# Patient Record
Sex: FEMALE | Race: WHITE | NOT HISPANIC OR LATINO
[De-identification: names, ages, dates, MRNs, and addresses within clinical notes are randomized per-mention and may not be internally consistent; named-entity substitution may affect disease eponyms.]

---

## 2017-01-12 ENCOUNTER — APPOINTMENT (OUTPATIENT)
Dept: CARDIOLOGY | Facility: CLINIC | Age: 67
End: 2017-01-12

## 2017-01-12 ENCOUNTER — OUTPATIENT (OUTPATIENT)
Dept: OUTPATIENT SERVICES | Facility: HOSPITAL | Age: 67
LOS: 1 days | Discharge: HOME | End: 2017-01-12

## 2017-01-24 ENCOUNTER — APPOINTMENT (OUTPATIENT)
Dept: CARDIOLOGY | Facility: CLINIC | Age: 67
End: 2017-01-24

## 2017-01-24 VITALS
HEIGHT: 63 IN | BODY MASS INDEX: 25.69 KG/M2 | HEART RATE: 59 BPM | DIASTOLIC BLOOD PRESSURE: 70 MMHG | OXYGEN SATURATION: 98 % | SYSTOLIC BLOOD PRESSURE: 104 MMHG | WEIGHT: 145 LBS

## 2017-01-24 DIAGNOSIS — Z82.49 FAMILY HISTORY OF ISCHEMIC HEART DISEASE AND OTHER DISEASES OF THE CIRCULATORY SYSTEM: ICD-10-CM

## 2017-01-24 DIAGNOSIS — Z78.9 OTHER SPECIFIED HEALTH STATUS: ICD-10-CM

## 2017-03-24 ENCOUNTER — APPOINTMENT (OUTPATIENT)
Dept: CARDIOLOGY | Facility: CLINIC | Age: 67
End: 2017-03-24

## 2017-03-24 VITALS — BODY MASS INDEX: 26.57 KG/M2 | SYSTOLIC BLOOD PRESSURE: 110 MMHG | DIASTOLIC BLOOD PRESSURE: 60 MMHG | WEIGHT: 150 LBS

## 2017-03-24 RX ORDER — APIXABAN 5 MG/1
5 TABLET, FILM COATED ORAL
Qty: 60 | Refills: 0 | Status: DISCONTINUED | COMMUNITY
Start: 2017-01-24 | End: 2017-03-24

## 2017-03-24 RX ORDER — FLECAINIDE ACETATE 50 MG/1
50 TABLET ORAL TWICE DAILY
Qty: 60 | Refills: 0 | Status: DISCONTINUED | COMMUNITY
Start: 2017-01-24 | End: 2017-03-24

## 2017-06-23 ENCOUNTER — APPOINTMENT (OUTPATIENT)
Dept: CARDIOLOGY | Facility: CLINIC | Age: 67
End: 2017-06-23

## 2017-06-27 DIAGNOSIS — I48.91 UNSPECIFIED ATRIAL FIBRILLATION: ICD-10-CM

## 2019-01-05 ENCOUNTER — INPATIENT (INPATIENT)
Facility: HOSPITAL | Age: 69
LOS: 1 days | Discharge: HOME | End: 2019-01-07
Attending: INTERNAL MEDICINE | Admitting: INTERNAL MEDICINE

## 2019-01-05 VITALS
DIASTOLIC BLOOD PRESSURE: 72 MMHG | WEIGHT: 149.91 LBS | HEIGHT: 66 IN | SYSTOLIC BLOOD PRESSURE: 117 MMHG | HEART RATE: 144 BPM | RESPIRATION RATE: 18 BRPM | TEMPERATURE: 98 F | OXYGEN SATURATION: 100 %

## 2019-01-05 LAB
ALBUMIN SERPL ELPH-MCNC: 3.9 G/DL — SIGNIFICANT CHANGE UP (ref 3.5–5.2)
ALP SERPL-CCNC: 131 U/L — HIGH (ref 30–115)
ALT FLD-CCNC: 205 U/L — HIGH (ref 0–41)
ANION GAP SERPL CALC-SCNC: 14 MMOL/L — SIGNIFICANT CHANGE UP (ref 7–14)
ANION GAP SERPL CALC-SCNC: 15 MMOL/L — HIGH (ref 7–14)
APTT BLD: 30 SEC — SIGNIFICANT CHANGE UP (ref 27–39.2)
AST SERPL-CCNC: 163 U/L — HIGH (ref 0–41)
BASE EXCESS BLDV CALC-SCNC: 1.9 MMOL/L — SIGNIFICANT CHANGE UP (ref -2–2)
BASOPHILS # BLD AUTO: 0.05 K/UL — SIGNIFICANT CHANGE UP (ref 0–0.2)
BASOPHILS NFR BLD AUTO: 0.5 % — SIGNIFICANT CHANGE UP (ref 0–1)
BILIRUB SERPL-MCNC: 0.2 MG/DL — SIGNIFICANT CHANGE UP (ref 0.2–1.2)
BUN SERPL-MCNC: 18 MG/DL — SIGNIFICANT CHANGE UP (ref 10–20)
BUN SERPL-MCNC: 22 MG/DL — HIGH (ref 10–20)
CA-I SERPL-SCNC: 1.2 MMOL/L — SIGNIFICANT CHANGE UP (ref 1.12–1.3)
CALCIUM SERPL-MCNC: 9.3 MG/DL — SIGNIFICANT CHANGE UP (ref 8.5–10.1)
CALCIUM SERPL-MCNC: 9.3 MG/DL — SIGNIFICANT CHANGE UP (ref 8.5–10.1)
CHLORIDE SERPL-SCNC: 102 MMOL/L — SIGNIFICANT CHANGE UP (ref 98–110)
CHLORIDE SERPL-SCNC: 99 MMOL/L — SIGNIFICANT CHANGE UP (ref 98–110)
CO2 SERPL-SCNC: 25 MMOL/L — SIGNIFICANT CHANGE UP (ref 17–32)
CO2 SERPL-SCNC: 27 MMOL/L — SIGNIFICANT CHANGE UP (ref 17–32)
CREAT SERPL-MCNC: 0.9 MG/DL — SIGNIFICANT CHANGE UP (ref 0.7–1.5)
CREAT SERPL-MCNC: 0.9 MG/DL — SIGNIFICANT CHANGE UP (ref 0.7–1.5)
D DIMER BLD IA.RAPID-MCNC: 243 NG/ML DDU — HIGH (ref 0–230)
EOSINOPHIL # BLD AUTO: 0.1 K/UL — SIGNIFICANT CHANGE UP (ref 0–0.7)
EOSINOPHIL NFR BLD AUTO: 1.1 % — SIGNIFICANT CHANGE UP (ref 0–8)
GAS PNL BLDV: 137 MMOL/L — SIGNIFICANT CHANGE UP (ref 136–145)
GAS PNL BLDV: SIGNIFICANT CHANGE UP
GLUCOSE SERPL-MCNC: 124 MG/DL — HIGH (ref 70–99)
GLUCOSE SERPL-MCNC: 150 MG/DL — HIGH (ref 70–99)
HCO3 BLDV-SCNC: 27 MMOL/L — SIGNIFICANT CHANGE UP (ref 22–29)
HCT VFR BLD CALC: 37.6 % — SIGNIFICANT CHANGE UP (ref 37–47)
HCT VFR BLD CALC: 38.1 % — SIGNIFICANT CHANGE UP (ref 37–47)
HCT VFR BLDA CALC: 41.1 % — SIGNIFICANT CHANGE UP (ref 34–44)
HGB BLD CALC-MCNC: 13.4 G/DL — LOW (ref 14–18)
HGB BLD-MCNC: 12.2 G/DL — SIGNIFICANT CHANGE UP (ref 12–16)
HGB BLD-MCNC: 12.7 G/DL — SIGNIFICANT CHANGE UP (ref 12–16)
IMM GRANULOCYTES NFR BLD AUTO: 0.3 % — SIGNIFICANT CHANGE UP (ref 0.1–0.3)
INR BLD: 1.02 RATIO — SIGNIFICANT CHANGE UP (ref 0.65–1.3)
IRON SATN MFR SERPL: 15 % — SIGNIFICANT CHANGE UP (ref 15–50)
IRON SATN MFR SERPL: 45 UG/DL — SIGNIFICANT CHANGE UP (ref 35–150)
LACTATE BLDV-MCNC: 1.1 MMOL/L — SIGNIFICANT CHANGE UP (ref 0.5–1.6)
LYMPHOCYTES # BLD AUTO: 3.97 K/UL — HIGH (ref 1.2–3.4)
LYMPHOCYTES # BLD AUTO: 42.7 % — SIGNIFICANT CHANGE UP (ref 20.5–51.1)
MCHC RBC-ENTMCNC: 29.3 PG — SIGNIFICANT CHANGE UP (ref 27–31)
MCHC RBC-ENTMCNC: 29.9 PG — SIGNIFICANT CHANGE UP (ref 27–31)
MCHC RBC-ENTMCNC: 32.4 G/DL — SIGNIFICANT CHANGE UP (ref 32–37)
MCHC RBC-ENTMCNC: 33.3 G/DL — SIGNIFICANT CHANGE UP (ref 32–37)
MCV RBC AUTO: 89.6 FL — SIGNIFICANT CHANGE UP (ref 81–99)
MCV RBC AUTO: 90.4 FL — SIGNIFICANT CHANGE UP (ref 81–99)
MONOCYTES # BLD AUTO: 0.56 K/UL — SIGNIFICANT CHANGE UP (ref 0.1–0.6)
MONOCYTES NFR BLD AUTO: 6 % — SIGNIFICANT CHANGE UP (ref 1.7–9.3)
NEUTROPHILS # BLD AUTO: 4.59 K/UL — SIGNIFICANT CHANGE UP (ref 1.4–6.5)
NEUTROPHILS NFR BLD AUTO: 49.4 % — SIGNIFICANT CHANGE UP (ref 42.2–75.2)
NRBC # BLD: 0 /100 WBCS — SIGNIFICANT CHANGE UP (ref 0–0)
NRBC # BLD: 0 /100 WBCS — SIGNIFICANT CHANGE UP (ref 0–0)
NT-PROBNP SERPL-SCNC: 1108 PG/ML — HIGH (ref 0–300)
PCO2 BLDV: 44 MMHG — SIGNIFICANT CHANGE UP (ref 41–51)
PH BLDV: 7.4 — SIGNIFICANT CHANGE UP (ref 7.26–7.43)
PLATELET # BLD AUTO: 281 K/UL — SIGNIFICANT CHANGE UP (ref 130–400)
PLATELET # BLD AUTO: 281 K/UL — SIGNIFICANT CHANGE UP (ref 130–400)
PO2 BLDV: 36 MMHG — SIGNIFICANT CHANGE UP (ref 20–40)
POTASSIUM BLDV-SCNC: 4.1 MMOL/L — SIGNIFICANT CHANGE UP (ref 3.3–5.6)
POTASSIUM SERPL-MCNC: 4.1 MMOL/L — SIGNIFICANT CHANGE UP (ref 3.5–5)
POTASSIUM SERPL-MCNC: 4.4 MMOL/L — SIGNIFICANT CHANGE UP (ref 3.5–5)
POTASSIUM SERPL-SCNC: 4.1 MMOL/L — SIGNIFICANT CHANGE UP (ref 3.5–5)
POTASSIUM SERPL-SCNC: 4.4 MMOL/L — SIGNIFICANT CHANGE UP (ref 3.5–5)
PROT SERPL-MCNC: 6.9 G/DL — SIGNIFICANT CHANGE UP (ref 6–8)
PROTHROM AB SERPL-ACNC: 11.7 SEC — SIGNIFICANT CHANGE UP (ref 9.95–12.87)
RBC # BLD: 4.16 M/UL — LOW (ref 4.2–5.4)
RBC # BLD: 4.25 M/UL — SIGNIFICANT CHANGE UP (ref 4.2–5.4)
RBC # FLD: 12.5 % — SIGNIFICANT CHANGE UP (ref 11.5–14.5)
RBC # FLD: 12.7 % — SIGNIFICANT CHANGE UP (ref 11.5–14.5)
SAO2 % BLDV: 67 % — SIGNIFICANT CHANGE UP
SODIUM SERPL-SCNC: 139 MMOL/L — SIGNIFICANT CHANGE UP (ref 135–146)
SODIUM SERPL-SCNC: 143 MMOL/L — SIGNIFICANT CHANGE UP (ref 135–146)
TIBC SERPL-MCNC: 297 UG/DL — SIGNIFICANT CHANGE UP (ref 220–430)
TRANSFERRIN SERPL-MCNC: 247 MG/DL — SIGNIFICANT CHANGE UP (ref 200–360)
TROPONIN T SERPL-MCNC: <0.01 NG/ML — SIGNIFICANT CHANGE UP
UIBC SERPL-MCNC: 252 UG/DL — SIGNIFICANT CHANGE UP (ref 110–370)
WBC # BLD: 10 K/UL — SIGNIFICANT CHANGE UP (ref 4.8–10.8)
WBC # BLD: 9.3 K/UL — SIGNIFICANT CHANGE UP (ref 4.8–10.8)
WBC # FLD AUTO: 10 K/UL — SIGNIFICANT CHANGE UP (ref 4.8–10.8)
WBC # FLD AUTO: 9.3 K/UL — SIGNIFICANT CHANGE UP (ref 4.8–10.8)

## 2019-01-05 RX ORDER — ASPIRIN/CALCIUM CARB/MAGNESIUM 324 MG
81 TABLET ORAL DAILY
Qty: 0 | Refills: 0 | Status: DISCONTINUED | OUTPATIENT
Start: 2019-01-05 | End: 2019-01-07

## 2019-01-05 RX ORDER — METOPROLOL TARTRATE 50 MG
50 TABLET ORAL
Qty: 0 | Refills: 0 | Status: DISCONTINUED | OUTPATIENT
Start: 2019-01-05 | End: 2019-01-07

## 2019-01-05 RX ORDER — INFLUENZA VIRUS VACCINE 15; 15; 15; 15 UG/.5ML; UG/.5ML; UG/.5ML; UG/.5ML
0.5 SUSPENSION INTRAMUSCULAR ONCE
Qty: 0 | Refills: 0 | Status: COMPLETED | OUTPATIENT
Start: 2019-01-05 | End: 2019-01-05

## 2019-01-05 RX ORDER — APIXABAN 2.5 MG/1
5 TABLET, FILM COATED ORAL EVERY 12 HOURS
Qty: 0 | Refills: 0 | Status: DISCONTINUED | OUTPATIENT
Start: 2019-01-05 | End: 2019-01-07

## 2019-01-05 RX ADMIN — Medication 50 MILLIGRAM(S): at 17:41

## 2019-01-05 RX ADMIN — APIXABAN 5 MILLIGRAM(S): 2.5 TABLET, FILM COATED ORAL at 05:59

## 2019-01-05 RX ADMIN — Medication 81 MILLIGRAM(S): at 12:31

## 2019-01-05 RX ADMIN — Medication 50 MILLIGRAM(S): at 05:59

## 2019-01-05 RX ADMIN — APIXABAN 5 MILLIGRAM(S): 2.5 TABLET, FILM COATED ORAL at 17:41

## 2019-01-05 NOTE — ED ADULT NURSE NOTE - CCCP TRG CHIEF CMPLNT
SOB/palpitations [No Pertinent Cardiac History] : no history of aortic stenosis, atrial fibrillation, coronary artery disease, recent myocardial infarction, or implantable device/pacemaker [No Pertinent Pulmonary History] : no history of asthma, COPD, sleep apnea, or smoking [No Adverse Anesthesia Reaction] : no adverse anesthesia reaction in self or family member [(Patient denies any chest pain, claudication, dyspnea on exertion, orthopnea, palpitations or syncope)] : Patient denies any chest pain, claudication, dyspnea on exertion, orthopnea, palpitations or syncope [Chronic Anticoagulation] : no chronic anticoagulation [Chronic Kidney Disease] : no chronic kidney disease [Diabetes] : no diabetes [FreeTextEntry1] : cataract [FreeTextEntry2] : Monday January 7, 2019    to be done at [facility] [FreeTextEntry3] : Kelly Perry MD

## 2019-01-05 NOTE — ED PROVIDER NOTE - MEDICAL DECISION MAKING DETAILS
Pt rate controlled w cardizem. HR 70s. Patient admitted to telemetry for further management and carer

## 2019-01-05 NOTE — H&P ADULT - ATTENDING COMMENTS
69 y/o FM w/ pMHx of Paroxysmal afib/aflutter p/w 3 day hx of palpitations and was a/w intermittent exertional sob. Since the palpitations were not resolving on their own pt presented to the ED. Pt was previously diagnosed w/ P-Afib/flutter by Dr. Hope and was started on metoprolol and flecanide for rhythm control. Pt's CHADSCVASC is 2 and was advised to be on eliquis. Whilst on eliquis an "aide" told her that she can easily bleed while being on the blood thinner and advised her to stop it hence she agreed. Pt has not bee on blood thinners for the past year.  Currently denies palpitaations, sob, dizziness, confusion, chest pain or abdominal pain or any focal deficitis    #Aflutter s/p IV Cardizem - Rate controlled   - CHADSCVASC 2  - HASBLED 0  - Now rate controlled with just PO metoprolol to 50 BID. Average HR on monitor 108/min. 1 full minute radial pulse counted by me to be 105/min. So at goal.   Cardio eval to see if she can be considered for cardioverrsion. Then potential D/c     - will start pt again on eliquis for A/C    - f/u echo -WNL     #Transaminitis (New) - Probably 2/2 the flecainide vs LISA  - AST/ALT: 163/205, . T bili wnl  -Off flecainide currently. Monitor CMP  - f/u RUQ sono  - f/u hepatitis panel, iron studies, LARISSA, ASMA, TSH,       #Activitiy: OOBC  #Diet: Regular  #DVT/GI PPX: Eliquis and Protonix  #dispo: form home lives with daughter - no needs

## 2019-01-05 NOTE — PATIENT PROFILE ADULT - NSTRANSFERBELONGINGSRESP_GEN_A_NUR
Subjective   Chief Complaint   Patient presents with   • [facility]     Camila March is a 41 y.o. year old  presenting to be seen for her annual exam.     SEXUAL Hx:  She is currently sexually active.  In the past year there there has been NO new sexual partners.    Condoms are never used.  She would not like to be screened for STD's at today's exam.  Current birth control method: not using any form of contraception.  She is not trying to conceive but would be OK if she did get pregnant.  She is happy with her current method of contraception and does not want to discuss alternative methods of contraception.  MENSTRUAL Hx:  Patient's last menstrual period was 2018 (approximate).  In the past 6 months her cycles have been regular, predictable and occur monthly.  Her menstrual flow is typically normal.   Each month on average there are roughly 0 day(s) of very heavy flow.    Intermenstrual bleeding is absent.    Post-coital bleeding is absent.  Dysmenorrhea: is not affecting her activities of daily living  PMS: is not affecting her activities of daily living  Her cycles are not a source of concern for her that she wishes to discuss today.  HEALTH Hx:  She exercises regularly: no (and has no plans to become more active).  She wears her seat belt: yes.  She has concerns about domestic violence: no.  OTHER THINGS SHE WANTS TO DISCUSS TODAY:  Nothing else    The following portions of the patient's history were reviewed and updated as appropriate:problem list, current medications, allergies, past family history, past medical history, past social history and past surgical history.    Smoking status: Never Smoker                                                              Smokeless tobacco: Never Used                        Review of Systems  Constitutional POS: nothing reported    NEG: anorexia or night sweats   Genitourinary POS: nothing reported    NEG: dysuria or hematuria      Gastointestinal  POS: nothing reported    NEG: bloating, change in bowel habits, melena or reflux symptoms   Integument POS: nothing reported    NEG: moles that are changing in size, shape, color or rashes   Breast POS: nothing reported    NEG: persistent breast lump, skin dimpling or nipple discharge        Objective   /74   Resp 14   Wt 83.9 kg (185 lb)   LMP 04/18/2018 (Approximate)   Breastfeeding? No   BMI 29.86 kg/m²     General:  well developed; well nourished  no acute distress   Skin:  No suspicious lesions seen   Thyroid: normal to inspection and palpation   Breasts:  Examined in supine position  Symmetric without masses or skin dimpling  Nipples normal without inversion, lesions or discharge  There are no palpable axillary nodes   Abdomen: soft, non-tender; no masses  no umbilical or inginual hernias are present  no hepato-splenomegaly   Pelvis: Clinical staff was present for exam  External genitalia:  normal appearance of the external genitalia including Bartholin's and [place]'s glands.  :  urethral meatus normal;  Vaginal:  normal pink mucosa without prolapse or lesions.  Cervix:  normal appearance.  Uterus:  normal size, shape and consistency.  Adnexa:  normal bimanual exam of the adnexa.  Rectal:  digital rectal exam not performed; anus visually normal appearing.        Assessment   1. Normal GYN exam     Plan   1. Pap was done today.  If she does not receive the results of the Pap within 2 weeks  time, she was instructed to call to find out the results.  I explained to Camila that the recommendations for Pap smear interval in a low risk patient's has lengthened to 3 years time.  I encouraged her to be seen yearly for a full physical exam including breast and pelvic exam even during the off years when PAP's will not be performed.  2. Folic acid for the prevention of neural tube defects was discussed.  At least 0.4 mg should be taken preconceptionally to reduce the risk.  It was explained that this may  reduce the baseline incidence of neural tube defect by as much as 65%.  Folic acid can be found in OTC multivitamins, OTC prenatal vitamins or breakfast cereal that that contains 100% of the RDA of folate.  3. The importance of keeping all planned follow-up and taking all medications as prescribed was emphasized.  4. Follow up for annual exam 1 year         This note was electronically signed.    Duy Rodríguez M.D.  May 2, 2018    Note: Speech recognition transcription software may have been used to create portions of this document.  An attempt at proofreading has been made but errors in transcription could still be present.   yes

## 2019-01-05 NOTE — H&P ADULT - ASSESSMENT
69 y/o FM w/ pMHx of Paroxysmal afib/aflutter p/w 3 day hx of palpitations and was a/w intermittent exertional sob. Since the palpitations were not resolving on their own pt presented to the ED. Pt was previously diagnosed w/ P-Afib/flutter by Dr. Hope and was started on metoprolol and flecanide for rhythm control. Pt's CHADSCVASC is 2 and was advised to be on eliquis. Whilst on eliquis an "aide" told her that she can easily bleed while being on the blood thinner and advised her to stop it hence she agreed. Pt has not bee on blood thinners for the past year.  Currently denies palpitaations, sob, dizziness, confusion, chest pain or abdominal pain or any focal deficitis    #Aflutter s/p IV Cardizem - Rate controlled   - CHADSCVASC 2  - HASBLED 0  - will increase dose of metoprolol to 50 BID  - will start pt on eliquis for A/C  - will get cardiology eval    #Transaminitis (New) - Probably 2/2 LISA  - AST/ALT: 163/205, . T bili wnl  - f/u RUQ sono  - f/u hepatitis panel, iron studies, LARISSA, ASMA, TSH,       #Activitiy: OOBC  #Diet: Regular  #DVT/GI PPX: Eliquis and Protonix  #dispo: form home lives with daughter - no needs 69 y/o FM w/ pMHx of Paroxysmal afib/aflutter p/w 3 day hx of palpitations and was a/w intermittent exertional sob. Since the palpitations were not resolving on their own pt presented to the ED. Pt was previously diagnosed w/ P-Afib/flutter by Dr. Hope and was started on metoprolol and flecanide for rhythm control. Pt's CHADSCVASC is 2 and was advised to be on eliquis. Whilst on eliquis an "aide" told her that she can easily bleed while being on the blood thinner and advised her to stop it hence she agreed. Pt has not bee on blood thinners for the past year.  Currently denies palpitaations, sob, dizziness, confusion, chest pain or abdominal pain or any focal deficitis    #Aflutter s/p IV Cardizem - Rate controlled   - CHADSCVASC 2  - HASBLED 0  - will increase dose of metoprolol to 50 BID  - will start pt on eliquis for A/C  - will get cardiology eval  - f/u echo    #Transaminitis (New) - Probably 2/2 LISA  - AST/ALT: 163/205, . T bili wnl  - f/u RUQ sono  - f/u hepatitis panel, iron studies, LARISSA, ASMA, TSH,       #Activitiy: OOBC  #Diet: Regular  #DVT/GI PPX: Eliquis and Protonix  #dispo: form home lives with daughter - no needs

## 2019-01-05 NOTE — ED PROVIDER NOTE - ATTENDING CONTRIBUTION TO CARE
I personally evaluated the patient. I reviewed the Resident’s or Physician Assistant’s note (as assigned above), and agree with the findings and plan except as documented in my note.    69 yo F with hx of atrial flutter presents for evaluation of palpitations for 3 days. No CP, SOB. No nausea, vomiting. No back pain. no fevers, chills.     CONSTITUTIONAL: Well-developed; well-nourished; in no acute distress. Sitting up and providing appropriate history and physical examination  SKIN: skin exam is warm and dry, no acute rash.  HEAD: Normocephalic; atraumatic.  EYES: PERRL, 3 mm bilateral, no nystagmus, EOM intact; conjunctiva and sclera clear.  ENT: No nasal discharge; airway clear.  NECK: Supple; non tender.+ full passive ROM in all directions. No JVD  CARD: S1, S2 normal; no murmurs, gallops, or rubs. Regular rate and rhythm. + Symmetric Strong Pulses  RESP: No wheezes, rales or rhonchi. Good air movement bilaterally  ABD: soft; non-distended; non-tender. No Rebound, No Gaurding, No signs of peritnitis, No CVA tenderness  EXT: Normal ROM. No clubbing, cyanosis or edema. Dp and Pt Pulses intact. Cap refill less than 3 seconds  NEURO: CN 2-12 intact, normal finger to nose, normal romberg, stable gait, no sensory or motor deficits, Alert, oriented, grossly unremarkable. No Focal deficits. GCS 15. NIH 0  PSYCH: Cooperative, appropriate.    A/P: ECG shows a flutter w RVR (prob 3 day onset). Will draw labs, CXR. Will admit

## 2019-01-05 NOTE — H&P ADULT - HISTORY OF PRESENT ILLNESS
69 y/o FM w/ pMHx of Paroxysmal afib/aflutter p/w 3 day hx of palpitations and was a/w intermittent exertional sob. Since the palpitations were not resolving on their own pt presented to the ED. Pt was previously diagnosed w/ P-Afib/flutter by Dr. Hope and was started on metoprolol and flecanide for rhythm control. Pt's CHADSCVASC is 2 and was advised to be on eliquis. Whilst on eliquis an "aide" told her that she can easily bleed while being on the blood thinner and advised her to stop it hence she agreed. Pt has not bee on blood thinners for the past year.  Currently denies palpitaations, sob, dizziness, confusion, chest pain or abdominal pain or any focal deficitis    In the ED,  Vital Signs Last 24 Hrs  T(C): 36.7 (05 Jan 2019 00:51), Max: 36.7 (05 Jan 2019 00:51)  T(F): 98.1 (05 Jan 2019 00:51), Max: 98.1 (05 Jan 2019 00:51)  HR: 84 (05 Jan 2019 01:45) (84 - 144)  BP: 126/66 (05 Jan 2019 01:45) (117/72 - 126/66)  BP(mean): --  RR: 18 (05 Jan 2019 01:45) (18 - 18)  SpO2: 99% (05 Jan 2019 01:45) (99% - 100%)  Given: Cardizem IV push x1

## 2019-01-05 NOTE — ED ADULT NURSE NOTE - OBJECTIVE STATEMENT
Patient reports having fast heart rate since Sunday, was hoping the rate would improve. Patient reports slight SOB, slight dizziness, denies acute sharp chest pain, denies nausea. Patient reports having episode of fast heart rate two years prior, resolved after seeking emergency treatment.

## 2019-01-06 LAB
ALBUMIN SERPL ELPH-MCNC: 4 G/DL — SIGNIFICANT CHANGE UP (ref 3.5–5.2)
ALP SERPL-CCNC: 109 U/L — SIGNIFICANT CHANGE UP (ref 30–115)
ALT FLD-CCNC: 128 U/L — HIGH (ref 0–41)
ANION GAP SERPL CALC-SCNC: 14 MMOL/L — SIGNIFICANT CHANGE UP (ref 7–14)
AST SERPL-CCNC: 43 U/L — HIGH (ref 0–41)
BILIRUB SERPL-MCNC: 0.2 MG/DL — SIGNIFICANT CHANGE UP (ref 0.2–1.2)
BUN SERPL-MCNC: 19 MG/DL — SIGNIFICANT CHANGE UP (ref 10–20)
CALCIUM SERPL-MCNC: 9.2 MG/DL — SIGNIFICANT CHANGE UP (ref 8.5–10.1)
CHLORIDE SERPL-SCNC: 102 MMOL/L — SIGNIFICANT CHANGE UP (ref 98–110)
CO2 SERPL-SCNC: 27 MMOL/L — SIGNIFICANT CHANGE UP (ref 17–32)
CREAT SERPL-MCNC: 0.8 MG/DL — SIGNIFICANT CHANGE UP (ref 0.7–1.5)
FERRITIN SERPL-MCNC: 79 NG/ML — SIGNIFICANT CHANGE UP (ref 15–150)
GLUCOSE SERPL-MCNC: 124 MG/DL — HIGH (ref 70–99)
HAV IGM SER-ACNC: SIGNIFICANT CHANGE UP
HBV CORE IGM SER-ACNC: SIGNIFICANT CHANGE UP
HBV SURFACE AG SER-ACNC: SIGNIFICANT CHANGE UP
HCV AB S/CO SERPL IA: 0.19 S/CO — SIGNIFICANT CHANGE UP
HCV AB SERPL-IMP: SIGNIFICANT CHANGE UP
POTASSIUM SERPL-MCNC: 4.4 MMOL/L — SIGNIFICANT CHANGE UP (ref 3.5–5)
POTASSIUM SERPL-SCNC: 4.4 MMOL/L — SIGNIFICANT CHANGE UP (ref 3.5–5)
PROT SERPL-MCNC: 6.6 G/DL — SIGNIFICANT CHANGE UP (ref 6–8)
SODIUM SERPL-SCNC: 143 MMOL/L — SIGNIFICANT CHANGE UP (ref 135–146)
TSH SERPL-MCNC: 4.77 UIU/ML — HIGH (ref 0.27–4.2)
TSH SERPL-MCNC: 7.04 UIU/ML — HIGH (ref 0.27–4.2)

## 2019-01-06 RX ORDER — DILTIAZEM HCL 120 MG
120 CAPSULE, EXT RELEASE 24 HR ORAL DAILY
Qty: 0 | Refills: 0 | Status: DISCONTINUED | OUTPATIENT
Start: 2019-01-06 | End: 2019-01-07

## 2019-01-06 RX ADMIN — APIXABAN 5 MILLIGRAM(S): 2.5 TABLET, FILM COATED ORAL at 17:58

## 2019-01-06 RX ADMIN — Medication 120 MILLIGRAM(S): at 18:39

## 2019-01-06 RX ADMIN — APIXABAN 5 MILLIGRAM(S): 2.5 TABLET, FILM COATED ORAL at 06:20

## 2019-01-06 RX ADMIN — Medication 50 MILLIGRAM(S): at 06:20

## 2019-01-06 RX ADMIN — Medication 50 MILLIGRAM(S): at 17:58

## 2019-01-06 RX ADMIN — Medication 81 MILLIGRAM(S): at 11:40

## 2019-01-06 NOTE — PROGRESS NOTE ADULT - SUBJECTIVE AND OBJECTIVE BOX
S : No new events./complaints  No palpitations/cp/sob  walking ok.      All other pertinent ROS negative.      Vital Signs Last 24 Hrs  T(C): 35.8 (06 Jan 2019 14:03), Max: 36.8 (05 Jan 2019 21:02)  T(F): 96.4 (06 Jan 2019 14:03), Max: 98.2 (05 Jan 2019 21:02)  HR: 75 (06 Jan 2019 14:36) (70 - 127)  BP: 104/62 (06 Jan 2019 14:36) (103/64 - 126/60)  BP(mean): --  RR: 18 (06 Jan 2019 14:03) (16 - 18)  SpO2: --  PHYSICAL EXAM:    Constitutional: NAD, awake and alert, well-developed  HEENT: PERR, EOMI, Normal Hearing, MMM  Neck: Soft and supple, No LAD, No JVD  Respiratory: Breath sounds are clear bilaterally, No wheezing, rales or rhonchi  Cardiovascular: S1 and S2, regular rate and rhythm, no Murmurs, gallops or rubs  Gastrointestinal: Bowel Sounds present, soft, nontender, nondistended, no guarding, no rebound  Extremities: No peripheral edema        MEDICATIONS:  MEDICATIONS  (STANDING):  apixaban 5 milliGRAM(s) Oral every 12 hours  aspirin  chewable 81 milliGRAM(s) Oral daily  diltiazem Injectable 10 milliGRAM(s) IV Push once  influenza   Vaccine 0.5 milliLiter(s) IntraMuscular once  metoprolol tartrate 50 milliGRAM(s) Oral two times a day      LABS: All Labs Reviewed:                        12.2   9.30  )-----------( 281      ( 05 Jan 2019 18:15 )             37.6     01-05    143  |  102  |  18  ----------------------------<  150<H>  4.1   |  27  |  0.9    Ca    9.3      05 Jan 2019 18:15    TPro  6.9  /  Alb  3.9  /  TBili  0.2  /  DBili  x   /  AST  163<H>  /  ALT  205<H>  /  AlkPhos  131<H>  01-05    PT/INR - ( 05 Jan 2019 01:28 )   PT: 11.70 sec;   INR: 1.02 ratio         PTT - ( 05 Jan 2019 01:28 )  PTT:30.0 sec  CARDIAC MARKERS ( 05 Jan 2019 01:28 )  x     / <0.01 ng/mL / x     / x     / x          Blood Culture:     Radiology: reviewed

## 2019-01-06 NOTE — CONSULT NOTE ADULT - ASSESSMENT
patient seen and examined.  remains in A-flutter.  options discussed.  compliance discussed.  speciifc rational for anticoagulation (prevention of CVA) discussed.  Options of rate control vs. YOLANDA/Cardioversion vs. Ablation disucssed.    Patient would like to hold off on any procedure and try rate control first.  She is agreeable for anticoagulation.    Recommend:  1. Eliquis 5 mg Q12  2. C/w Metoprolol 50 mg q12  3. Add Cardizem  mg q24  May d/c home form cardiac perspective. Will consider RFA or cardioversion as outpatient if has recurrent tachycardia.  W/u of elevated LFT's as per primary team.  F/u with me in 2 weeks.

## 2019-01-06 NOTE — CONSULT NOTE ADULT - SUBJECTIVE AND OBJECTIVE BOX
Patient is a 68y old  Female who presents with a chief complaint of palpitations (05 Jan 2019 04:52)      HPI:  67 y/o FM w/ pMHx of Paroxysmal afib/aflutter p/w 3 day hx of palpitations and was a/w intermittent exertional sob. Since the palpitations were not resolving on their own pt presented to the ED. Pt was previously diagnosed w/ P-Afib/flutter by Dr. Hope and was started on metoprolol and flecanide for rhythm control. Pt's CHADSCVASC is 2 and was advised to be on eliquis. Whilst on eliquis an "aide" told her that she can easily bleed while being on the blood thinner and advised her to stop it hence she agreed. Pt has not bee on blood thinners for the past year.  Currently denies palpitaations, sob, dizziness, confusion, chest pain or abdominal pain or any focal deficitis    In the ED,  Vital Signs Last 24 Hrs  T(C): 36.7 (05 Jan 2019 00:51), Max: 36.7 (05 Jan 2019 00:51)  T(F): 98.1 (05 Jan 2019 00:51), Max: 98.1 (05 Jan 2019 00:51)  HR: 84 (05 Jan 2019 01:45) (84 - 144)  BP: 126/66 (05 Jan 2019 01:45) (117/72 - 126/66)  BP(mean): --  RR: 18 (05 Jan 2019 01:45) (18 - 18)  SpO2: 99% (05 Jan 2019 01:45) (99% - 100%)  Given: Cardizem IV push x1 (05 Jan 2019 04:52)      PAST MEDICAL & SURGICAL HISTORY:  Arrhythmia  No significant past surgical history      PREVIOUS DIAGNOSTIC TESTING:      ECHO  FINDINGS:  < from: Transthoracic Echocardiogram (01.05.19 @ 11:07) >  Summary:   1. LV Ejection Fraction by Ring's Method with a biplane EF of 54 %.   2. Normal left ventricular internal cavity size.   3. Normal left atrial size.   4. Normal right atrial size.   5. There is no evidence of pericardial effusion.   6. Mild mitral annular calcification.   7. Thickening of the anterior and posterior mitral valve leaflets.   8. Pulmonic valve regurgitation.    < end of copied text >      MEDICATIONS  (STANDING):  apixaban 5 milliGRAM(s) Oral every 12 hours  aspirin  chewable 81 milliGRAM(s) Oral daily  diltiazem Injectable 10 milliGRAM(s) IV Push once  influenza   Vaccine 0.5 milliLiter(s) IntraMuscular once  metoprolol tartrate 50 milliGRAM(s) Oral two times a day    MEDICATIONS  (PRN):      FAMILY HISTORY:  No pertinent family history in first degree relatives      SOCIAL HISTORY:  CIGARETTES:    ALCOHOL:    REVIEW OF SYSTEMS:  CONSTITUTIONAL: No fever, weight loss, or fatigue  EYES: No eye pain, visual disturbances, or discharge  ENMT:  No difficulty hearing, tinnitus, vertigo; No sinus or throat pain  NECK: No pain or stiffness  BREASTS: No pain, masses, or nipple discharge  RESPIRATORY: No cough, wheezing, chills or hemoptysis; No shortness of breath  CARDIOVASCULAR: See HPI.  GASTROINTESTINAL: No abdominal or epigastric pain. No nausea, vomiting, or hematemesis; No diarrhea or constipation. No melena or hematochezia.  GENITOURINARY: No dysuria, frequency, hematuria, or incontinence  NEUROLOGICAL: No headaches, memory loss, loss of strength, numbness, or tremors  SKIN: No itching, burning, rashes, or lesions   ENDOCRINE: No heat or cold intolerance;   MUSCULOSKELETAL: No joint pain or swelling; No muscle, back, or extremity pain  PSYCHIATRIC: No depression, anxiety, mood swings, or difficulty sleeping  HEME/LYMPH: No easy bruising, or bleeding gums  ALLERY AND IMMUNOLOGIC: No hives or eczema        Vital Signs Last 24 Hrs  T(C): 35.8 (06 Jan 2019 14:03), Max: 36.8 (05 Jan 2019 21:02)  T(F): 96.4 (06 Jan 2019 14:03), Max: 98.2 (05 Jan 2019 21:02)  HR: 75 (06 Jan 2019 14:36) (70 - 127)  BP: 104/62 (06 Jan 2019 14:36) (103/64 - 126/60)  BP(mean): --  RR: 18 (06 Jan 2019 14:03) (16 - 18)  SpO2: --        PHYSICAL EXAM:  GENERAL: NAD, AAO x 3  HEAD:  Atraumatic, Normocephalic  EYES: EOMI, PERRL, conjunctiva and sclera clear  ENMT: Moist mucous membranes  NECK: Supple, No JVD, No bruits  NERVOUS SYSTEM:  Alert & Oriented X3, Good concentration; No focal deficits  CHEST/LUNG: Clear to percussion bilaterally; No rales, rhonchi, wheezing, or rubs  HEART: Regular rate and rhythm; No murmurs, rubs, or gallops  ABDOMEN: Soft, Nontender, Nondistended; Bowel sounds present  EXTREMITIES:   No clubbing, cyanosis, or edema  SKIN: No rashes or lesions    INTERPRETATION OF TELEMETRY: A. flutter with 4:1 block    ECG:  < from: 12 Lead ECG (01.05.19 @ 07:52) >  Atrial flutter with variable A-V block  Inferior infarct , age undetermined  Cannot rule out Anterior infarct , age undetermined  Abnormal ECG    < end of copied text >      I&O's Detail      LABS:                        12.2   9.30  )-----------( 281      ( 05 Jan 2019 18:15 )             37.6     01-05    143  |  102  |  18  ----------------------------<  150<H>  4.1   |  27  |  0.9    Ca    9.3      05 Jan 2019 18:15    TPro  6.9  /  Alb  3.9  /  TBili  0.2  /  DBili  x   /  AST  163<H>  /  ALT  205<H>  /  AlkPhos  131<H>  01-05    CARDIAC MARKERS ( 05 Jan 2019 01:28 )  x     / <0.01 ng/mL / x     / x     / x          PT/INR - ( 05 Jan 2019 01:28 )   PT: 11.70 sec;   INR: 1.02 ratio         PTT - ( 05 Jan 2019 01:28 )  PTT:30.0 sec    I&O's Summary      RADIOLOGY & ADDITIONAL STUDIES:

## 2019-01-06 NOTE — PROGRESS NOTE ADULT - ASSESSMENT
69 y/o FM w/ pMHx of Paroxysmal afib/aflutter p/w 3 day hx of palpitations and was a/w intermittent exertional sob. Since the palpitations were not resolving on their own pt presented to the ED. Pt was previously diagnosed w/ P-Afib/flutter by Dr. Hope and was started on metoprolol and flecanide for rhythm control. Pt's CHADSCVASC is 2 and was advised to be on eliquis. Whilst on eliquis an "aide" told her that she can easily bleed while being on the blood thinner and advised her to stop it hence she agreed. Pt has not bee on blood thinners for the past year.  Currently denies palpitaations, sob, dizziness, confusion, chest pain or abdominal pain or any focal deficitis    #Aflutter s/p IV Cardizem   - CHADSCVASC 2  - HASBLED 0  - Now rate controlled with just PO metoprolol 50 BID. Average HR on monitor 70s - 108/min. 1 full minute radial pulse counted by me to be 105/min. So at goal.   Cardio suggests adding CArdizem  mg.   Cardio discussed options of cardioverrsion versus rate control. Patient wants to try just rate control for now.  Agrees for A/c.    c/w Eliquis    - f/u echo -WNL     #Transaminitis (New) - Probably 2/2 the flecainide  - AST/ALT: 163/205, . T bili wnl  -Off flecainide currently. Monitor CMP  No repeat CMP ordered this AM. We sent a CMP today afternoon. If it comes back to be less than yesterday - D/c patient home.   - RUQ sono - WNL  - hepatitis panel negative.  f/u iron studies, LRAISSA, ASMA, TSH,       #Activitiy: OOBC  #Diet: Regular  #DVT/GI PPX: Eliquis and Protonix  #dispo: form home lives with daughter - anticipate for discharge late tonight or tomorrow morning if LFTs show improvement.

## 2019-01-07 ENCOUNTER — TRANSCRIPTION ENCOUNTER (OUTPATIENT)
Age: 69
End: 2019-01-07

## 2019-01-07 VITALS — DIASTOLIC BLOOD PRESSURE: 62 MMHG | TEMPERATURE: 97 F | SYSTOLIC BLOOD PRESSURE: 112 MMHG | HEART RATE: 77 BPM

## 2019-01-07 LAB
ALBUMIN SERPL ELPH-MCNC: 3.7 G/DL — SIGNIFICANT CHANGE UP (ref 3.5–5.2)
ALP SERPL-CCNC: 94 U/L — SIGNIFICANT CHANGE UP (ref 30–115)
ALT FLD-CCNC: 102 U/L — HIGH (ref 0–41)
ANION GAP SERPL CALC-SCNC: 13 MMOL/L — SIGNIFICANT CHANGE UP (ref 7–14)
AST SERPL-CCNC: 32 U/L — SIGNIFICANT CHANGE UP (ref 0–41)
BILIRUB SERPL-MCNC: 0.4 MG/DL — SIGNIFICANT CHANGE UP (ref 0.2–1.2)
BUN SERPL-MCNC: 14 MG/DL — SIGNIFICANT CHANGE UP (ref 10–20)
CALCIUM SERPL-MCNC: 9.2 MG/DL — SIGNIFICANT CHANGE UP (ref 8.5–10.1)
CHLORIDE SERPL-SCNC: 99 MMOL/L — SIGNIFICANT CHANGE UP (ref 98–110)
CO2 SERPL-SCNC: 27 MMOL/L — SIGNIFICANT CHANGE UP (ref 17–32)
CREAT SERPL-MCNC: 0.7 MG/DL — SIGNIFICANT CHANGE UP (ref 0.7–1.5)
GLUCOSE SERPL-MCNC: 137 MG/DL — HIGH (ref 70–99)
POTASSIUM SERPL-MCNC: 3.8 MMOL/L — SIGNIFICANT CHANGE UP (ref 3.5–5)
POTASSIUM SERPL-SCNC: 3.8 MMOL/L — SIGNIFICANT CHANGE UP (ref 3.5–5)
PROT SERPL-MCNC: 6.4 G/DL — SIGNIFICANT CHANGE UP (ref 6–8)
SMOOTH MUSCLE AB SER-ACNC: SIGNIFICANT CHANGE UP
SODIUM SERPL-SCNC: 139 MMOL/L — SIGNIFICANT CHANGE UP (ref 135–146)

## 2019-01-07 RX ORDER — METOPROLOL TARTRATE 50 MG
1 TABLET ORAL
Qty: 60 | Refills: 0
Start: 2019-01-07 | End: 2019-02-05

## 2019-01-07 RX ORDER — APIXABAN 2.5 MG/1
1 TABLET, FILM COATED ORAL
Qty: 0 | Refills: 0 | COMMUNITY
Start: 2019-01-07

## 2019-01-07 RX ORDER — DILTIAZEM HCL 120 MG
1 CAPSULE, EXT RELEASE 24 HR ORAL
Qty: 0 | Refills: 0 | COMMUNITY
Start: 2019-01-07

## 2019-01-07 RX ORDER — APIXABAN 2.5 MG/1
1 TABLET, FILM COATED ORAL
Qty: 60 | Refills: 0 | OUTPATIENT
Start: 2019-01-07 | End: 2019-02-05

## 2019-01-07 RX ORDER — DILTIAZEM HCL 120 MG
1 CAPSULE, EXT RELEASE 24 HR ORAL
Qty: 30 | Refills: 0
Start: 2019-01-07 | End: 2019-02-05

## 2019-01-07 RX ORDER — DILTIAZEM HCL 120 MG
180 CAPSULE, EXT RELEASE 24 HR ORAL DAILY
Qty: 0 | Refills: 0 | Status: DISCONTINUED | OUTPATIENT
Start: 2019-01-08 | End: 2019-01-07

## 2019-01-07 RX ORDER — METOPROLOL TARTRATE 50 MG
1 TABLET ORAL
Qty: 0 | Refills: 0 | COMMUNITY

## 2019-01-07 RX ORDER — APIXABAN 2.5 MG/1
1 TABLET, FILM COATED ORAL
Qty: 60 | Refills: 0
Start: 2019-01-07 | End: 2019-02-05

## 2019-01-07 RX ORDER — METOPROLOL TARTRATE 50 MG
1 TABLET ORAL
Qty: 0 | Refills: 0 | COMMUNITY
Start: 2019-01-07

## 2019-01-07 RX ORDER — ASPIRIN/CALCIUM CARB/MAGNESIUM 324 MG
1 TABLET ORAL
Qty: 0 | Refills: 0 | COMMUNITY

## 2019-01-07 RX ADMIN — APIXABAN 5 MILLIGRAM(S): 2.5 TABLET, FILM COATED ORAL at 05:56

## 2019-01-07 RX ADMIN — Medication 120 MILLIGRAM(S): at 05:58

## 2019-01-07 RX ADMIN — Medication 81 MILLIGRAM(S): at 11:47

## 2019-01-07 NOTE — PROGRESS NOTE ADULT - ASSESSMENT
options discussed  again refused ablation or YOLANDA/cardioversion  Prefers medical therapy/rate control. As her HR is controlled now, this is acceptable.  She is agreeable for anticoagulation.    C/w metoprolol 50 mg q12. C/w Eliquis 5 mg q12  D/c ASA  C/w Cardizem  mg QD. If HR elevated, increase to 180 mg QD  Outpatient follow-up to make a decision on further management.  D/c planning for today.

## 2019-01-07 NOTE — PROGRESS NOTE ADULT - SUBJECTIVE AND OBJECTIVE BOX
ROSEANN MCDANIEL  68y Female    CHIEF COMPLAINT:    Patient is a 68y old  Female who presents with a chief complaint of palpitations (2019 09:52)      INTERVAL HPI/OVERNIGHT EVENTS:    Patient seen and examined.    ROS: All other systems are negative.    Vital Signs:    T(F): 98 (19 @ 05:47), Max: 98 (19 @ 05:47)  HR: 75 (19 @ 05:47) (75 - 133)  BP: 109/62 (19 @ 05:47) (104/62 - 126/60)  RR: 18 (19 @ 05:47) (16 - 18)  SpO2: 98% (19 @ 08:10) (98% - 98%)  I&O's Summary    Daily Height in cm: 167.64 (2019 09:52)    Daily Weight in k (2019 06:24)  CAPILLARY BLOOD GLUCOSE          PHYSICAL EXAM:    GENERAL:  NAD  SKIN: No rashes or lesions  HENT: Atrumatic. Normocephalic. PERRL. Moist membranes.  NECK: Supple, No JVD. No lymphadenopathy.  PULMONARY: CTA B/L. No wheezing. No rales  CVS: Normal S1, S2. Rate and Rythm are regular. No murmurs.  ABDOMEN/GI: Soft, Nontender, Nondistended; BS present  EXTREMITIES: Peripheral pulses intact. No edema B/L LE.  NEUROLOGIC:  No motor or sensory deficit.  PSYCH: Alert & oriented x 3    Consultant(s) Notes Reviewed:  [x ] YES  [ ] NO  Care Discussed with Consultants/Other Providers [ x] YES  [ ] NO    EKG reviewed  Telemetry reviewed    LABS:                        12.2   9.30  )-----------( 281      ( 2019 18:15 )             37.6     -    139  |  99  |  14  ----------------------------<  137<H>  3.8   |  27  |  0.7    Ca    9.2      2019 08:25    TPro  6.4  /  Alb  3.7  /  TBili  0.4  /  DBili  x   /  AST  32  /  ALT  102<H>  /  AlkPhos  94        Serum Pro-Brain Natriuretic Peptide: 1108 pg/mL (19 @ 01:28)    Trop <0.01, CKMB --, CK --, 19 @ 01:28        RADIOLOGY & ADDITIONAL TESTS:      Imaging or report Personally Reviewed:  [ ] YES  [ ] NO    Medications:  Standing  apixaban 5 milliGRAM(s) Oral every 12 hours  aspirin  chewable 81 milliGRAM(s) Oral daily  diltiazem    milliGRAM(s) Oral daily  influenza   Vaccine 0.5 milliLiter(s) IntraMuscular once  metoprolol tartrate 50 milliGRAM(s) Oral two times a day    PRN Meds      Case discussed with resident    Care discussed with pt/family ROSEANN MCDANIEL  68y Female    CHIEF COMPLAINT:    Patient is a 68y old  Female who presents with a chief complaint of palpitations (2019 09:52)      INTERVAL HPI/OVERNIGHT EVENTS:    Patient seen and examined. C/O on and off palpitations. No sob. No cp    ROS: All other systems are negative.    Vital Signs:    T(F): 98 (19 @ 05:47), Max: 98 (19 @ 05:47)  HR: 75 (19 @ 05:47) (75 - 133)  BP: 109/62 (19 @ 05:47) (104/62 - 126/60)  RR: 18 (19 @ 05:47) (16 - 18)  SpO2: 98% (19 @ 08:10) (98% - 98%)  I&O's Summary    Daily Height in cm: 167.64 (2019 09:52)    Daily Weight in k (2019 06:24)  CAPILLARY BLOOD GLUCOSE          PHYSICAL EXAM:    GENERAL:  NAD  SKIN: No rashes or lesions  HENT: Atrumatic. Normocephalic. PERRL. Moist membranes.  NECK: Supple, No JVD. No lymphadenopathy.  PULMONARY: CTA B/L. No wheezing. No rales  CVS: Normal S1, S2. Rate and Rythm are irregular. No murmurs.  ABDOMEN/GI: Soft, Nontender, Nondistended; BS present  EXTREMITIES: Peripheral pulses intact. No edema B/L LE.  NEUROLOGIC:  No motor or sensory deficit.  PSYCH: Alert & oriented x 3    Consultant(s) Notes Reviewed:  [x ] YES  [ ] NO  Care Discussed with Consultants/Other Providers [ x] YES  [ ] NO    EKG reviewed  Telemetry reviewed    LABS:                        12.2   9.30  )-----------( 281      ( 2019 18:15 )             37.6     -    139  |  99  |  14  ----------------------------<  137<H>  3.8   |  27  |  0.7    Ca    9.2      2019 08:25    TPro  6.4  /  Alb  3.7  /  TBili  0.4  /  DBili  x   /  AST  32  /  ALT  102<H>  /  AlkPhos  94  01-07      Serum Pro-Brain Natriuretic Peptide: 1108 pg/mL (19 @ 01:28)    Trop <0.01, CKMB --, CK --, 19 @ 01:28        RADIOLOGY & ADDITIONAL TESTS:      Imaging or report Personally Reviewed:  [ ] YES  [ ] NO    Medications:  Standing  apixaban 5 milliGRAM(s) Oral every 12 hours  aspirin  chewable 81 milliGRAM(s) Oral daily  diltiazem    milliGRAM(s) Oral daily  influenza   Vaccine 0.5 milliLiter(s) IntraMuscular once  metoprolol tartrate 50 milliGRAM(s) Oral two times a day    PRN Meds      Case discussed with resident    Care discussed with pt/family

## 2019-01-07 NOTE — DISCHARGE NOTE ADULT - CARE PROVIDER_API CALL
Gunnar Renae (MD), Cardiovascular Disease; Internal Medicine; Interventional Cardiology  [address] [place] [address]  Phone: [phone]  Fax: [phone]

## 2019-01-07 NOTE — DISCHARGE NOTE ADULT - PATIENT PORTAL LINK FT
You can access the [facility] Patient Portal, offered by [facility], by registering with the following website: [email or web address]

## 2019-01-07 NOTE — DISCHARGE NOTE ADULT - CARE PLAN
Principal Discharge DX:	Typical atrial flutter  Goal:	Rate control  Assessment and plan of treatment:	You have atrial flutter. Please continue all medications as above. Please follow up with Dr. Renae within 1 week. Present to your nearest emergency facility in the event of chest pain, shortness of breath , or loss of consciousness.

## 2019-01-07 NOTE — DISCHARGE NOTE ADULT - MEDICATION SUMMARY - MEDICATIONS TO TAKE
I will START or STAY ON the medications listed below when I get home from the hospital:    dilTIAZem 180 mg/24 hours oral capsule, extended release  -- 1 cap(s) by mouth once a day  -- Indication: For A flutter    apixaban 5 mg oral tablet  -- 1 tab(s) by mouth every 12 hours  -- Indication: For A flutter    metoprolol tartrate 50 mg oral tablet  -- 1 tab(s) by mouth 2 times a day  -- Indication: For A flutter

## 2019-01-07 NOTE — PROGRESS NOTE ADULT - ASSESSMENT
SUBJECTIVE:    Patient is a 68y old Female who presents with a chief complaint of palpitations (07 Jan 2019 11:27)    Currently admitted to medicine with the primary diagnosis of Atrial flutter     Today is hospital day 2d. This morning she is resting comfortably in bed and reports no new issues or overnight events. Denies palpitations, chest pain.  HR increased today.     PAST MEDICAL & SURGICAL HISTORY  Arrhythmia  No significant past surgical history    SOCIAL HISTORY:  Negative for smoking/alcohol/drug use.     ALLERGIES:  No Known Allergies    MEDICATIONS:  STANDING MEDICATIONS  apixaban 5 milliGRAM(s) Oral every 12 hours  aspirin  chewable 81 milliGRAM(s) Oral daily  influenza   Vaccine 0.5 milliLiter(s) IntraMuscular once  metoprolol tartrate 50 milliGRAM(s) Oral two times a day    PRN MEDICATIONS    VITALS:   T(F): 98  HR: 110  BP: 124/77  RR: 18  SpO2: 98%    LABS:                        12.2   9.30  )-----------( 281      ( 05 Jan 2019 18:15 )             37.6     01-07    139  |  99  |  14  ----------------------------<  137<H>  3.8   |  27  |  0.7    Ca    9.2      07 Jan 2019 08:25    TPro  6.4  /  Alb  3.7  /  TBili  0.4  /  DBili  x   /  AST  32  /  ALT  102<H>  /  AlkPhos  94  01-07                  RADIOLOGY:    PHYSICAL EXAM:  GEN: No acute distress  LUNGS: Clear to auscultation bilaterally   HEART: Regular  ABD: Soft, non-tender, non-distended.  EXT: NC/NC/NE/2+PP/ROBERTS/Skin Intact.   NEURO: AAOX3    Intravenous access:   NG tube:   Viera Catheter: <<<RESIDENT DISCHARGE NOTE>>>     ROSEANN MCDANIEL  MRN-[id number]    VITAL SIGNS:  T(F): 97.3 (01-07-19 @ 14:30), Max: 98 (01-07-19 @ 05:47)  HR: 77 (01-07-19 @ 14:30)  BP: 112/62 (01-07-19 @ 14:30)  SpO2: 98% (01-07-19 @ 08:10)  Weight (kg): 69.5 (01-07-19 @ 09:52)  BMI (kg/m2): 24.7 (01-07-19 @ 09:52)    PHYSICAL EXAMINATION:  General: NAD, ambulating  Head & Neck: No JVD  Pulmonary:Clear to auscultation b/l   Cardiovascular: Irregular, tachy to 120s  Gastrointestinal/Abdomen & Pelvis: Soft, non-tender , non-distended.   Neurologic/Motor: Full ROM b/l     TEST RESULTS:      01-07    139  |  99  |  14  ----------------------------<  137<H>  3.8   |  27  |  0.7    Ca    9.2      07 Jan 2019 08:25    TPro  6.4  /  Alb  3.7  /  TBili  0.4  /  DBili  x   /  AST  32  /  ALT  102<H>  /  AlkPhos  94  01-07      FINAL DISCHARGE INTERVIEW:  Resident(s) Present: (Name:__Rafael_________), RN Present: (Name:  ___________)    DISCHARGE MEDICATION RECONCILIATION  reviewed with Attending (Name:Sung__________)    DISPOSITION:   [ X ] Home,    [  ] Home with Visiting Nursing Services,   [    ]  SNF/ NH,    [   ] Acute Rehab (4A),   [   ] Other (Specify:_________)

## 2019-01-07 NOTE — PROGRESS NOTE ADULT - ASSESSMENT
69 y/o FM w/ pMHx of Paroxysmal afib/aflutter p/w 3 day hx of palpitations was a/w intermittent exertional sob.     1.	Atrial flutter         PLAN:    ·	Care D/W the cardiology. Pt. refused YOLANDA/CV.  ·	HR is not controlled. Will increase Cardizem CD to 180 mg po q 24h  ·	Cont Metoprolol 50 mg po q 12h  ·	Cont Eliquis 67 y/o FM w/ pMHx of Paroxysmal afib/aflutter p/w 3 day hx of palpitations was a/w intermittent exertional sob.     1.	Atrial flutter         PLAN:    ·	Care D/W the cardiology. Pt. refused YOLANDA/CV.  ·	HR is not controlled. Will increase Cardizem CD to 180 mg po q 24h. Give Cardizem 60 mg po x 1 now  ·	Cont Metoprolol 50 mg po q 12h  ·	Cont Eliquis  ·	I had a long D/W the pt about CV and ablation. She will think about that. 67 y/o FM w/ pMHx of Paroxysmal afib/aflutter p/w 3 day hx of palpitations was a/w intermittent exertional sob.     1.	Atrial flutter         PLAN:    ·	Care D/W the cardiology. Pt. refused YOLANDA/CV.  ·	HR is not controlled. Will increase Cardizem CD to 180 mg po q 24h. Give Cardizem 60 mg po x 1 now  ·	Cont Metoprolol 50 mg po q 12h  ·	Cont Eliquis  ·	I had a long D/W the pt about CV and ablation. She will think about that.    * Pt is insisting to go home. HR is controlled. Med rec reviewed with the intern. Plan of care D/W the pt. Time spent 40 minutes.

## 2019-01-07 NOTE — DISCHARGE NOTE ADULT - PLAN OF CARE
Rate control You have atrial flutter. Please continue all medications as above. Please follow up with Dr. Renae within 1 week. Present to your nearest emergency facility in the event of chest pain, shortness of breath , or loss of consciousness.

## 2019-01-07 NOTE — DISCHARGE NOTE ADULT - HOSPITAL COURSE
69 y/o FM w/ pMHx of Paroxysmal afib/aflutter p/w 3 day hx of palpitations and was a/w intermittent exertional sob. Since the palpitations were not resolving on their own pt presented to the ED. Pt was previously diagnosed w/ P-Afib/flutter by Dr. Hope and was started on metoprolol and flecanide for rhythm control. Pt's CHADSCVASC is 2 and was advised to be on eliquis. Whilst on eliquis an "aide" told her that she can easily bleed while being on the blood thinner and advised her to stop it hence she agreed. Pt has not bee on blood thinners for the past year.  Currently denies palpitaations, sob, dizziness, confusion, chest pain or abdominal pain or any focal deficitis    In the ED,  Vital Signs Last 24 Hrs  T(C): 36.7 (05 Jan 2019 00:51), Max: 36.7 (05 Jan 2019 00:51)  T(F): 98.1 (05 Jan 2019 00:51), Max: 98.1 (05 Jan 2019 00:51)  HR: 84 (05 Jan 2019 01:45) (84 - 144)  BP: 126/66 (05 Jan 2019 01:45) (117/72 - 126/66)  BP(mean): --  RR: 18 (05 Jan 2019 01:45) (18 - 18)  SpO2: 99% (05 Jan 2019 01:45) (99% - 100%)  Given: Cardizem IV push x1  Patient was admitted to telemetry. She received metoprolol and cardizem, with subsequent rate control. Her heart rate does increase when patient ambulates, however, she refuses to undergo ablation or cardioversion, and wishes to try medical management first. Patient will follow up with cardiology within 1 week of discharge, and is medically stable for discharge.

## 2019-01-09 LAB — ANA TITR SER: NEGATIVE — SIGNIFICANT CHANGE UP

## 2019-01-15 DIAGNOSIS — R74.0 NONSPECIFIC ELEVATION OF LEVELS OF TRANSAMINASE AND LACTIC ACID DEHYDROGENASE [LDH]: ICD-10-CM

## 2019-01-15 DIAGNOSIS — T46.2X5A ADVERSE EFFECT OF OTHER ANTIDYSRHYTHMIC DRUGS, INITIAL ENCOUNTER: ICD-10-CM

## 2019-01-15 DIAGNOSIS — I48.92 UNSPECIFIED ATRIAL FLUTTER: ICD-10-CM

## 2019-01-15 DIAGNOSIS — Z91.19 PATIENT'S NONCOMPLIANCE WITH OTHER MEDICAL TREATMENT AND REGIMEN: ICD-10-CM

## 2019-01-15 DIAGNOSIS — Z79.82 LONG TERM (CURRENT) USE OF ASPIRIN: ICD-10-CM

## 2019-09-28 ENCOUNTER — EMERGENCY (EMERGENCY)
Facility: HOSPITAL | Age: 69
LOS: 0 days | Discharge: HOME | End: 2019-09-28
Attending: EMERGENCY MEDICINE | Admitting: EMERGENCY MEDICINE
Payer: MEDICARE

## 2019-09-28 VITALS
SYSTOLIC BLOOD PRESSURE: 125 MMHG | OXYGEN SATURATION: 97 % | TEMPERATURE: 97 F | RESPIRATION RATE: 17 BRPM | DIASTOLIC BLOOD PRESSURE: 61 MMHG | HEIGHT: 65 IN | WEIGHT: 149.91 LBS | HEART RATE: 90 BPM

## 2019-09-28 PROCEDURE — 99283 EMERGENCY DEPT VISIT LOW MDM: CPT

## 2019-09-28 NOTE — ED PROVIDER NOTE - PATIENT PORTAL LINK FT
You can access the FollowMyHealth Patient Portal offered by [facility] by registering at the following website: [email or web address]. By joining [facility]’s FollowMyHealth portal, you will also be able to view your health information using other applications (apps) compatible with our system.

## 2019-09-28 NOTE — ED PROVIDER NOTE - CLINICAL SUMMARY MEDICAL DECISION MAKING FREE TEXT BOX
70yo F history of AFlutter on Eliquis presenting with L eye redness since this evening- has had URI, cough for the past few days. This evening noticed L eye redness. No other complaints. No trauma, no vision changes, no headache, no neck pain/stiffness. Denies all other bleeding. Denies hemoptysis, hematemesis, hematuria, BRBPR, melena, vaginal bleeding. Since onset hasn't noticed worsening. Comfortable with discharge and follow-up outpatient, strict return precautions given. Endorses understanding of all of this and aware that they can return at any time for new or concerning symptoms. No further questions or concerns at this time

## 2019-09-28 NOTE — ED ADULT NURSE NOTE - NSIMPLEMENTINTERV_GEN_ALL_ED
Implemented All Universal Safety Interventions:  [place] to call system. Call bell, personal items and telephone within reach. Instruct patient to call for assistance. Room bathroom lighting operational. Non-slip footwear when patient is off stretcher. Physically safe environment: no spills, clutter or unnecessary equipment. Stretcher in lowest position, wheels locked, appropriate side rails in place.

## 2019-09-28 NOTE — ED PROVIDER NOTE - PHYSICAL EXAMINATION
Constitutional: Well appearing. No acute distress. Non toxic.   Eyes: PERRLA. Extraocular movements intact, no entrapment. +L subconjunctival hemorrhage. Corrected vision 20/20. No APD.  ENT: No nasal discharge. Moist mucus membranes.  Neck: Supple, non tender, full range of motion.  Ext: Warm and well perfused x4, moving all extremities, no edema.   Psy: Cooperative, appropriate.   Skin: Warm, dry, no rash  Neuro: CN2-12 grossly intact no sensory or motor deficits throughout, no drift, no ataxia

## 2019-09-28 NOTE — ED PROVIDER NOTE - OBJECTIVE STATEMENT
70yo F history of AFlutter on Eliquis presenting with L eye redness since this evening- has had URI, cough for the past few days. This evening noticed L eye redness. No other complaints. No trauma, no vision changes, no headache, no neck pain/stiffness. Denies all other bleeding. Denies hemoptysis, hematemesis, hematuria, BRBPR, melena, vaginal bleeding. Since onset hasn't noticed worsening.

## 2019-09-28 NOTE — ED ADULT TRIAGE NOTE - CHIEF COMPLAINT QUOTE
"few hours back my eye turned red to left side." denies change in vision or eye pain. denies injury to eye. (pt on Eliquis)

## 2019-09-28 NOTE — ED PROVIDER NOTE - NSFOLLOWUPINSTRUCTIONS_ED_ALL_ED_FT
Subconjunctival Hemorrhage    Subconjunctival hemorrhage is bleeding that happens between the white part of your eye (sclera) and the clear membrane that covers the outside of your eye (conjunctiva). There are many tiny blood vessels near the surface of your eye. A subconjunctival hemorrhage happens when one or more of these vessels breaks and bleeds, causing a red patch to appear on your eye. This is similar to a bruise.    Depending on the amount of bleeding, the red patch may only cover a small area of your eye or it may cover the entire visible part of the sclera. If a lot of blood collects under the conjunctiva, there may also be swelling. Subconjunctival hemorrhages do not affect your vision or cause pain, but your eye may feel irritated if there is swelling. Subconjunctival hemorrhages usually do not require treatment, and they disappear on their own within two weeks.     CAUSES  This condition may be caused by:    Mild trauma, such as rubbing your eye too hard.  Severe trauma or blunt injuries.  Coughing, sneezing, or vomiting.  Straining, such as when lifting a heavy object.  High blood pressure.  Recent eye surgery.  A history of diabetes.  Certain medicines, especially blood thinners (anticoagulants).  Other conditions, such as eye tumors, bleeding disorders, or blood vessel abnormalities.    Subconjunctival hemorrhages can happen without an obvious cause.     SYMPTOMS  Symptoms of this condition include:    A bright red or dark red patch on the white part of the eye.  The red area may spread out to cover a larger area of the eye before it goes away.  The red area may turn brownish-yellow before it goes away.  Swelling.  Mild eye irritation.    DIAGNOSIS  This condition is diagnosed with a physical exam. If your subconjunctival hemorrhage was caused by trauma, your health care provider may refer you to an eye specialist (ophthalmologist) or another specialist to check for other injuries. You may have other tests, including:    An eye exam.  A blood pressure check.  Blood tests to check for bleeding disorders.    If your subconjunctival hemorrhage was caused by trauma, X-rays or a CT scan may be done to check for other injuries.    TREATMENT  Usually, no treatment is needed. Your health care provider may recommend eye drops or cold compresses to help with discomfort.    HOME CARE INSTRUCTIONS  Take over-the-counter and prescription medicines only as directed by your health care provider.  Use eye drops or cold compresses to help with discomfort as directed by your health care provider.  Avoid activities, things, and environments that may irritate or injure your eye.  Keep all follow-up visits as told by your health care provider. This is important.    SEEK MEDICAL CARE IF:  You have pain in your eye.  The bleeding does not go away within 3 weeks.  You keep getting new subconjunctival hemorrhages.    SEEK IMMEDIATE MEDICAL CARE IF:  Your vision changes or you have difficulty seeing.  You suddenly develop severe sensitivity to light.  You develop a severe headache, persistent vomiting, confusion, or abnormal tiredness (lethargy).  Your eye seems to bulge or protrude from your eye socket.  You develop unexplained bruises on your body.  You have unexplained bleeding in another area of your body.

## 2019-09-28 NOTE — ED PROVIDER NOTE - NSFOLLOWUPCLINICS_GEN_ALL_ED_FT
[facility]  Ophthalmolgy  [address], [address]  [place] [address]  Phone: [phone]  Fax:   Follow Up Time:

## 2019-09-28 NOTE — ED ADULT NURSE NOTE - OBJECTIVE STATEMENT
Patient presents to ED c/o left eye redness. Patient on eliquis for treatment of afib. Patient denies any hx of trauma or pain. Patient denies vision changes.

## 2019-09-29 PROBLEM — I49.9 CARDIAC ARRHYTHMIA, UNSPECIFIED: Chronic | Status: ACTIVE | Noted: 2019-01-05

## 2019-10-03 ENCOUNTER — OUTPATIENT (OUTPATIENT)
Dept: OUTPATIENT SERVICES | Facility: HOSPITAL | Age: 69
LOS: 1 days | Discharge: HOME | End: 2019-10-03
Payer: MEDICARE

## 2019-10-03 PROCEDURE — 99213 OFFICE O/P EST LOW 20 MIN: CPT

## 2019-10-04 DIAGNOSIS — H11.32 CONJUNCTIVAL HEMORRHAGE, LEFT EYE: ICD-10-CM

## 2019-10-04 DIAGNOSIS — H57.89 OTHER SPECIFIED DISORDERS OF EYE AND ADNEXA: ICD-10-CM

## 2019-10-15 DIAGNOSIS — H25.093 OTHER AGE-RELATED INCIPIENT CATARACT, BILATERAL: ICD-10-CM

## 2019-10-15 DIAGNOSIS — H52.4 PRESBYOPIA: ICD-10-CM

## 2019-10-15 DIAGNOSIS — H11.32 CONJUNCTIVAL HEMORRHAGE, LEFT EYE: ICD-10-CM

## 2019-10-15 DIAGNOSIS — H52.223 REGULAR ASTIGMATISM, BILATERAL: ICD-10-CM

## 2019-10-15 DIAGNOSIS — H52.03 HYPERMETROPIA, BILATERAL: ICD-10-CM

## 2019-11-29 ENCOUNTER — APPOINTMENT (OUTPATIENT)
Dept: PULMONOLOGY | Facility: CLINIC | Age: 69
End: 2019-11-29

## 2020-07-31 ENCOUNTER — APPOINTMENT (OUTPATIENT)
Dept: CARDIOLOGY | Facility: CLINIC | Age: 70
End: 2020-07-31
Payer: MEDICARE

## 2020-07-31 ENCOUNTER — LABORATORY RESULT (OUTPATIENT)
Age: 70
End: 2020-07-31

## 2020-07-31 VITALS
WEIGHT: 150 LBS | RESPIRATION RATE: 16 BRPM | SYSTOLIC BLOOD PRESSURE: 110 MMHG | HEART RATE: 139 BPM | OXYGEN SATURATION: 98 % | BODY MASS INDEX: 26.58 KG/M2 | DIASTOLIC BLOOD PRESSURE: 70 MMHG | HEIGHT: 63 IN | TEMPERATURE: 98.5 F

## 2020-07-31 PROCEDURE — 93000 ELECTROCARDIOGRAM COMPLETE: CPT

## 2020-07-31 PROCEDURE — 99204 OFFICE O/P NEW MOD 45 MIN: CPT

## 2020-07-31 PROCEDURE — 93970 EXTREMITY STUDY: CPT

## 2020-07-31 RX ORDER — METOPROLOL TARTRATE 25 MG/1
25 TABLET, FILM COATED ORAL TWICE DAILY
Qty: 120 | Refills: 3 | Status: DISCONTINUED | COMMUNITY
End: 2020-07-31

## 2020-07-31 RX ORDER — APIXABAN 5 MG/1
5 TABLET, FILM COATED ORAL
Qty: 3 | Refills: 3 | Status: DISCONTINUED | COMMUNITY
Start: 2017-03-24 | End: 2020-07-31

## 2020-07-31 NOTE — REASON FOR VISIT
[Initial Evaluation] : an initial evaluation of [FreeTextEntry2] : edema,  [FreeTextEntry1] : 66 years old female with h/o non-compliance with h/o pAF (not on AC / self D/Cd) presents today s/p mechanical fall couple of months ago. Reports no fracture but since since event c/o b/l edema and 'fullness sensation' in both LE with Sx that a re not improving. Reports dyspnea that is chronic, and worsening dyspnea now requiring 4 pillows, denies CP. \par Stress echo from 2017 Mod MR, mild TR \par \par LDL 73\par TSH 5.9 free T4 \par  A1C 6.0

## 2020-07-31 NOTE — CARDIOLOGY SUMMARY
From: Carla Camarillo  To: Elyse Escobedo MD  Sent: 7/24/2020 2:52 PM EDT  Subject: Prescription Question    My pharmacy needs my prescription for Esmeprazole 40mg refilled. I called and spoke to someone at [facility], here in [place] and they sent a request a few days ago. I only have 3 pills left and these are essential for my acid reflux. Thank you.     
[___] : [unfilled]

## 2020-07-31 NOTE — HISTORY OF PRESENT ILLNESS
[No Clear Factors] : no clear factors [Beta Blockers] : beta blockers [FreeTextEntry1] : \par  \par \par \par \par

## 2020-07-31 NOTE — PHYSICAL EXAM
[General Appearance - Well Nourished] : well nourished [General Appearance - Well Developed] : well developed [General Appearance - In No Acute Distress] : no acute distress [Normal Oral Mucosa] : normal oral mucosa [Heart Rate And Rhythm] : heart rate and rhythm were normal [] : no respiratory distress [Respiration, Rhythm And Depth] : normal respiratory rhythm and effort [Exaggerated Use Of Accessory Muscles For Inspiration] : no accessory muscle use [Bowel Sounds] : normal bowel sounds [Auscultation Breath Sounds / Voice Sounds] : lungs were clear to auscultation bilaterally [Abdomen Soft] : soft [Skin Color & Pigmentation] : normal skin color and pigmentation [Oriented To Time, Place, And Person] : oriented to person, place, and time [FreeTextEntry1] : edema

## 2020-07-31 NOTE — DISCUSSION/SUMMARY
[FreeTextEntry1] : 66 years old female non-compliance\par pAF (not on AC / self D/Cd) \par h/o mechanical fall couple of months ago\par c/o b/l edema extending to b/l thighs \par Reports worsening dyspnea and 4 pillow orthopnea now. \par Stress echo from 2017 Mod MR, mild TR, no evidence of ischemia  \par \par LDL 73 , \par TSH 5.9\par  A1C 6.0  \par \par Plan:\par - UA , BNP, CMP  \par - Echo\par - Increase Toprol 50 daily \par - Le venous duplex\par - Event monitor \par - F/u Dr Head next week\par - F/u 3 weeks \par \par

## 2020-07-31 NOTE — REVIEW OF SYSTEMS
[Palpitations] : palpitations [Negative] : Neurological [Shortness Of Breath] : no shortness of breath [Chest  Pressure] : no chest pressure [Dyspnea on exertion] : not dyspnea during exertion [Leg Claudication] : no intermittent leg claudication [Lower Ext Edema] : no extremity edema [Chest Pain] : no chest pain

## 2020-08-03 ENCOUNTER — APPOINTMENT (OUTPATIENT)
Dept: CARDIOLOGY | Facility: CLINIC | Age: 70
End: 2020-08-03
Payer: MEDICARE

## 2020-08-03 VITALS
DIASTOLIC BLOOD PRESSURE: 75 MMHG | BODY MASS INDEX: 29.26 KG/M2 | WEIGHT: 159 LBS | SYSTOLIC BLOOD PRESSURE: 125 MMHG | HEIGHT: 62 IN

## 2020-08-03 DIAGNOSIS — I48.92 UNSPECIFIED ATRIAL FLUTTER: ICD-10-CM

## 2020-08-03 DIAGNOSIS — I48.0 PAROXYSMAL ATRIAL FIBRILLATION: ICD-10-CM

## 2020-08-03 PROCEDURE — 93000 ELECTROCARDIOGRAM COMPLETE: CPT

## 2020-08-03 PROCEDURE — 99204 OFFICE O/P NEW MOD 45 MIN: CPT

## 2020-08-03 RX ORDER — METOPROLOL SUCCINATE 50 MG/1
50 TABLET, EXTENDED RELEASE ORAL
Qty: 30 | Refills: 6 | Status: DISCONTINUED | COMMUNITY
End: 2020-08-03

## 2020-08-03 RX ORDER — ASPIRIN 81 MG/1
81 TABLET, COATED ORAL
Refills: 0 | Status: DISCONTINUED | COMMUNITY
End: 2020-08-03

## 2020-08-03 NOTE — DISCUSSION/SUMMARY
[FreeTextEntry1] : 70-yo female with persistent A-fib, poor rate control, evidence of decompensated CHF at this time.\par [id number] Vasc score 3.\par \par Plan:\par Increase Metoprolol to 50 mg bid.\par Start Furosemide 40 mg and K-dur 20 meq daily.\par 2D ECHO scheduled.\par The indication for anticoagulation discussed with patient, she will consider it again but refused to start today.\par Repeat BW with TSH.\par f/u in 3 weeks.\par \par Travis Head MD\par

## 2020-08-03 NOTE — REVIEW OF SYSTEMS
[Recent Weight Gain (___ Lbs)] : recent [unfilled] ~Ulb weight gain [Shortness Of Breath] : shortness of breath [Dyspnea on exertion] : dyspnea during exertion [Lower Ext Edema] : lower extremity edema [Palpitations] : palpitations [Negative] : Endocrine [Skin: A Rash] : no rash: [Anxiety] : no anxiety [Easy Bruising] : no tendency for easy bruising [Easy Bleeding] : no tendency for easy bleeding

## 2020-08-03 NOTE — HISTORY OF PRESENT ILLNESS
[FreeTextEntry1] : 70-yo female with h/o A-fib for several years (followed by several cardiologists and EP, failed cardioversion, refused ablation). Patient has been non-compliant with many medications, she stopped anticoagulation several months ago, stopped ASA several weeks ago.\par \par Patient fell at home, injured both ankles, had bilateral pain, swelling for 2-3 weeks. Since then, ankle pain and swelling have resolved but she has developed progressive leg edema, increased SHETH, she sleeps with several pillows.

## 2020-08-03 NOTE — PHYSICAL EXAM
[General Appearance - Well Developed] : well developed [Normal Appearance] : normal appearance [Well Groomed] : well groomed [No Deformities] : no deformities [General Appearance - Well Nourished] : well nourished [Eyelids - No Xanthelasma] : the eyelids demonstrated no xanthelasmas [Normal Conjunctiva] : the conjunctiva exhibited no abnormalities [General Appearance - In No Acute Distress] : no acute distress [Normal] : normal [5th Left ICS - MCL] : palpated at the 5th LICS in the midclavicular line [No Precordial Heave] : no precordial heave was noted [Tachycardia] : tachycardic [Irregularly Irregular] : irregularly irregular [Normal S1] : normal S1 [Normal S2] : normal S2 [No Murmur] : no murmurs heard [___+] : [unfilled]U+ pretibial pitting edema on the right [Respiration, Rhythm And Depth] : normal respiratory rhythm and effort [Exaggerated Use Of Accessory Muscles For Inspiration] : no accessory muscle use [Auscultation Breath Sounds / Voice Sounds] : lungs were clear to auscultation bilaterally [Bowel Sounds] : normal bowel sounds [Abdomen Soft] : soft [Abdomen Tenderness] : non-tender [Abdomen Mass (___ Cm)] : no abdominal mass palpated [Cyanosis, Localized] : no localized cyanosis [Skin Color & Pigmentation] : normal skin color and pigmentation [Oriented To Time, Place, And Person] : oriented to person, place, and time [] : no rash [Affect] : the affect was normal [Mood] : the mood was normal [S3] : no S3 [Click] : no click [S4] : no S4

## 2020-08-04 ENCOUNTER — APPOINTMENT (OUTPATIENT)
Dept: CARDIOLOGY | Facility: CLINIC | Age: 70
End: 2020-08-04
Payer: MEDICARE

## 2020-08-04 PROCEDURE — 93306 TTE W/DOPPLER COMPLETE: CPT

## 2020-08-10 LAB
ALBUMIN SERPL ELPH-MCNC: 3.8 G/DL
ALP BLD-CCNC: 124 U/L
ALT SERPL-CCNC: 27 U/L
ANION GAP SERPL CALC-SCNC: 16 MMOL/L
AST SERPL-CCNC: 40 U/L
BILIRUB SERPL-MCNC: 1.1 MG/DL
BUN SERPL-MCNC: 12 MG/DL
CALCIUM SERPL-MCNC: 9.4 MG/DL
CHLORIDE SERPL-SCNC: 97 MMOL/L
CO2 SERPL-SCNC: 24 MMOL/L
CREAT SERPL-MCNC: 1.1 MG/DL
GLUCOSE SERPL-MCNC: 135 MG/DL
NT-PROBNP SERPL-MCNC: 3407 PG/ML
POTASSIUM SERPL-SCNC: 5 MMOL/L
PROT SERPL-MCNC: 6.3 G/DL
SODIUM SERPL-SCNC: 137 MMOL/L

## 2020-08-13 ENCOUNTER — APPOINTMENT (OUTPATIENT)
Dept: CARDIOLOGY | Facility: CLINIC | Age: 70
End: 2020-08-13
Payer: MEDICARE

## 2020-08-13 ENCOUNTER — RECORD ABSTRACTING (OUTPATIENT)
Age: 70
End: 2020-08-13

## 2020-08-13 VITALS
SYSTOLIC BLOOD PRESSURE: 100 MMHG | WEIGHT: 157 LBS | HEIGHT: 63 IN | BODY MASS INDEX: 27.82 KG/M2 | DIASTOLIC BLOOD PRESSURE: 80 MMHG

## 2020-08-13 DIAGNOSIS — Z86.79 PERSONAL HISTORY OF OTHER DISEASES OF THE CIRCULATORY SYSTEM: ICD-10-CM

## 2020-08-13 PROCEDURE — 93000 ELECTROCARDIOGRAM COMPLETE: CPT | Mod: 59

## 2020-08-13 PROCEDURE — 99214 OFFICE O/P EST MOD 30 MIN: CPT

## 2020-08-13 RX ORDER — FUROSEMIDE 40 MG/1
40 TABLET ORAL DAILY
Qty: 30 | Refills: 1 | Status: DISCONTINUED | COMMUNITY
Start: 2020-08-03 | End: 2020-08-13

## 2020-08-13 NOTE — REVIEW OF SYSTEMS
[Recent Weight Gain (___ Lbs)] : recent [unfilled] ~Ulb weight gain [Feeling Fatigued] : feeling fatigued [Shortness Of Breath] : shortness of breath [Dyspnea on exertion] : dyspnea during exertion [Lower Ext Edema] : lower extremity edema [Palpitations] : palpitations [Negative] : Neurological [Skin: A Rash] : no rash: [Anxiety] : no anxiety [Easy Bleeding] : no tendency for easy bleeding [Easy Bruising] : no tendency for easy bruising

## 2020-08-13 NOTE — HISTORY OF PRESENT ILLNESS
[FreeTextEntry1] : 70-yo female with h/o A-fib for several years (followed by several cardiologists and EP, failed cardioversion, refused ablation). Patient has been non-compliant with many medications, she stopped anticoagulation several months ago, stopped ASA several weeks ago.\par \par Patient fell at home, injured both ankles, had bilateral pain, swelling for 2-3 weeks. Since then, ankle pain and swelling have resolved but she has developed progressive leg edema, increased SHETH, she sleeps with several pillows. \par \par Since last visit, she felt some improvement initially, but felt weak one day, cut her Metoprolol. She c/o weakness, SHETH, palpitations now.

## 2020-08-13 NOTE — DISCUSSION/SUMMARY
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 She verbalizes understanding at this point and appears willing to comply with medical therapy.\par Will try outpatient for 1 more week, will admit to [facility] next time unless there is significant clinical improvement.\par \par Plan:\par Increase Metoprolol to 50 mg bid.\par Increase Furosemide 40 mg and K-dur 20 meq to bid.\par Start Eliquis 5 mg bid today (samples given).\par WIll repeat BW with TSH after next visit.\par F/u in 1 week.\par \par Travis Head MD\par

## 2020-08-13 NOTE — ASSESSMENT
[FreeTextEntry1] : ECG: A-fib, /min, NSST changes.\par \par Holter monitor:\par A-fib, average  bpm.\par \par 2D ECHO:\par Global LV hypokinesis, EF 20-25%\par Mod LAE, [place]\par Mod MR, TR, PHT\par \par GFR 51\par ProBNP [id number]

## 2020-08-13 NOTE — PHYSICAL EXAM
[General Appearance - Well Developed] : well developed [Normal Appearance] : normal appearance [Well Groomed] : well groomed [No Deformities] : no deformities [General Appearance - Well Nourished] : well nourished [General Appearance - In No Acute Distress] : no acute distress [Normal Conjunctiva] : the conjunctiva exhibited no abnormalities [Eyelids - No Xanthelasma] : the eyelids demonstrated no xanthelasmas [Exaggerated Use Of Accessory Muscles For Inspiration] : no accessory muscle use [Respiration, Rhythm And Depth] : normal respiratory rhythm and effort [5th Left ICS - MCL] : palpated at the 5th LICS in the midclavicular line [Auscultation Breath Sounds / Voice Sounds] : lungs were clear to auscultation bilaterally [Normal] : normal [No Precordial Heave] : no precordial heave was noted [Tachycardia] : tachycardic [Irregularly Irregular] : irregularly irregular [Normal S1] : normal S1 [Normal S2] : normal S2 [No Murmur] : no murmurs heard [___ +] : bilateral [unfilled]U+ pitting edema to the ankles [Bowel Sounds] : normal bowel sounds [Abdomen Tenderness] : non-tender [Abdomen Soft] : soft [Abdomen Mass (___ Cm)] : no abdominal mass palpated [Cyanosis, Localized] : no localized cyanosis [Skin Color & Pigmentation] : normal skin color and pigmentation [] : no rash [Mood] : the mood was normal [Oriented To Time, Place, And Person] : oriented to person, place, and time [Affect] : the affect was normal [S3] : no S3 [S4] : no S4 [Click] : no click

## 2020-08-21 ENCOUNTER — RESULT CHARGE (OUTPATIENT)
Age: 70
End: 2020-08-21

## 2020-08-21 ENCOUNTER — APPOINTMENT (OUTPATIENT)
Dept: CARDIOLOGY | Facility: CLINIC | Age: 70
End: 2020-08-21
Payer: MEDICARE

## 2020-08-21 VITALS
SYSTOLIC BLOOD PRESSURE: 110 MMHG | WEIGHT: 150 LBS | OXYGEN SATURATION: 98 % | HEIGHT: 63 IN | BODY MASS INDEX: 26.58 KG/M2 | TEMPERATURE: 96.8 F | DIASTOLIC BLOOD PRESSURE: 80 MMHG | HEART RATE: 140 BPM

## 2020-08-21 PROCEDURE — 99214 OFFICE O/P EST MOD 30 MIN: CPT

## 2020-08-21 PROCEDURE — 93000 ELECTROCARDIOGRAM COMPLETE: CPT

## 2020-08-21 NOTE — DISCUSSION/SUMMARY
[FreeTextEntry1] : 66 years old female non-compliance\par pAF (not on AC / self D/Cd) \par h/o mechanical fall couple of months ago\par c/o b/l edema extending to b/l thighs \par Reports worsening dyspnea and 4 pillow orthopnea now. \par Stress echo from 2017 Mod MR, mild TR, no evidence of ischemia  \par 7/20 Echo - EF 20-25%  \par \par LDL 73 , \par TSH 5.9\par  A1C 6.0  \par \par \par Plan:\par - Continue risk factor mods\par - F/u cardiology.\par - Again Rx compliance is encouraged    \par \par

## 2020-08-21 NOTE — REVIEW OF SYSTEMS
[Recent Weight Gain (___ Lbs)] : recent [unfilled] ~Ulb weight gain [Shortness Of Breath] : shortness of breath [Feeling Fatigued] : feeling fatigued [Dyspnea on exertion] : dyspnea during exertion [Lower Ext Edema] : lower extremity edema [Palpitations] : palpitations [Dizziness] : dizziness [Negative] : Endocrine [Anxiety] : no anxiety [Skin: A Rash] : no rash: [Easy Bruising] : no tendency for easy bruising [Easy Bleeding] : no tendency for easy bleeding

## 2020-08-21 NOTE — REVIEW OF SYSTEMS
[Palpitations] : palpitations [Negative] : Musculoskeletal [Dyspnea on exertion] : dyspnea during exertion [Shortness Of Breath] : no shortness of breath [Chest  Pressure] : no chest pressure [Chest Pain] : no chest pain [Lower Ext Edema] : no extremity edema [Leg Claudication] : no intermittent leg claudication

## 2020-08-21 NOTE — HISTORY OF PRESENT ILLNESS
[FreeTextEntry1] : 70-yo female with h/o A-fib for several years (followed by several cardiologists and EP, failed cardioversion, refused ablation). Patient has been non-compliant with many medications, she stopped anticoagulation several months ago, stopped ASA several weeks ago.\par \par Patient fell at home, injured both ankles, had bilateral pain, swelling for 2-3 weeks. Since then, ankle pain and swelling have resolved but she has developed progressive leg edema, increased SHETH, she sleeps with several pillows. \par \par Since last visit, she felt improvement of dyspnea, c/o weakness when her BP drops.

## 2020-08-21 NOTE — PHYSICAL EXAM
[General Appearance - Well Developed] : well developed [Well Groomed] : well groomed [Normal Appearance] : normal appearance [General Appearance - Well Nourished] : well nourished [General Appearance - In No Acute Distress] : no acute distress [Normal Conjunctiva] : the conjunctiva exhibited no abnormalities [No Deformities] : no deformities [Eyelids - No Xanthelasma] : the eyelids demonstrated no xanthelasmas [Exaggerated Use Of Accessory Muscles For Inspiration] : no accessory muscle use [Auscultation Breath Sounds / Voice Sounds] : lungs were clear to auscultation bilaterally [Respiration, Rhythm And Depth] : normal respiratory rhythm and effort [Normal] : normal [5th Left ICS - MCL] : palpated at the 5th LICS in the midclavicular line [No Precordial Heave] : no precordial heave was noted [Tachycardia] : tachycardic [Irregularly Irregular] : irregularly irregular [Normal S1] : normal S1 [Normal S2] : normal S2 [No Murmur] : no murmurs heard [___ +] : bilateral [unfilled]U+ pitting edema to the ankles [Abdomen Soft] : soft [Abdomen Tenderness] : non-tender [Bowel Sounds] : normal bowel sounds [Abdomen Mass (___ Cm)] : no abdominal mass palpated [Cyanosis, Localized] : no localized cyanosis [Skin Color & Pigmentation] : normal skin color and pigmentation [] : no rash [Oriented To Time, Place, And Person] : oriented to person, place, and time [Affect] : the affect was normal [Mood] : the mood was normal [S4] : no S4 [S3] : no S3 [Click] : no click

## 2020-08-21 NOTE — ASSESSMENT
[FreeTextEntry1] : 70 year old F patient with permanent A fib, HFrEF present for follow Up \par \par -Patient got her echo; EF 20-25%, LA dilation \par -Holter showed avg heart rate of 140s \par -Lower extremities Duplex negative for DVT \par \par Plan\par - \par -Continue with Lasix \par -Potassium is 5 on the last BMP

## 2020-08-21 NOTE — REASON FOR VISIT
[Initial Evaluation] : an initial evaluation of [FreeTextEntry2] : edema,  [FreeTextEntry1] : 66 years old female with h/o non-compliance with h/o pAF (not on AC / self D/Cd) presents today for f/u. H/o mechanical fall couple of months ago. Reports no fracture but since since event c/o b/l edema and 'fullness sensation' in both LE with Sx that a re not improving. Reports dyspnea that is chronic, and worsening dyspnea now requiring 4 pillows, denies CP. \par Stress echo from 2017 Mod MR, mild TR \par Now for f/u Echo with EF 20-25% Now compliant with BB, AC and diabetics.  \par With complete resolution of edema \par \par LDL 73\par TSH 5.9 free T4 \par  A1C 6.0

## 2020-08-21 NOTE — ASSESSMENT
[FreeTextEntry1] : ECG: A-fib, /min, NSST changes.\par \par ECHO:\par LVEF 20-25%\par Moderate MR\par Mild TR, PASP 54

## 2020-08-21 NOTE — PHYSICAL EXAM
[General Appearance - Well Developed] : well developed [General Appearance - Well Nourished] : well nourished [Normal Oral Mucosa] : normal oral mucosa [General Appearance - In No Acute Distress] : no acute distress [] : no respiratory distress [Auscultation Breath Sounds / Voice Sounds] : lungs were clear to auscultation bilaterally [Respiration, Rhythm And Depth] : normal respiratory rhythm and effort [Exaggerated Use Of Accessory Muscles For Inspiration] : no accessory muscle use [Heart Rate And Rhythm] : heart rate and rhythm were normal [Abdomen Soft] : soft [Bowel Sounds] : normal bowel sounds [Skin Color & Pigmentation] : normal skin color and pigmentation [Oriented To Time, Place, And Person] : oriented to person, place, and time [FreeTextEntry1] : edema, ternderness on the right lower extremities

## 2020-08-21 NOTE — DISCUSSION/SUMMARY
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

## 2020-08-26 ENCOUNTER — APPOINTMENT (OUTPATIENT)
Dept: CARDIOLOGY | Facility: CLINIC | Age: 70
End: 2020-08-26

## 2020-08-28 ENCOUNTER — APPOINTMENT (OUTPATIENT)
Dept: CARDIOLOGY | Facility: CLINIC | Age: 70
End: 2020-08-28
Payer: MEDICARE

## 2020-08-28 VITALS
WEIGHT: 140 LBS | HEIGHT: 62 IN | SYSTOLIC BLOOD PRESSURE: 100 MMHG | DIASTOLIC BLOOD PRESSURE: 70 MMHG | BODY MASS INDEX: 25.76 KG/M2 | TEMPERATURE: 98.1 F

## 2020-08-28 PROCEDURE — 99214 OFFICE O/P EST MOD 30 MIN: CPT

## 2020-08-28 PROCEDURE — 93000 ELECTROCARDIOGRAM COMPLETE: CPT

## 2020-08-28 NOTE — REVIEW OF SYSTEMS
[Recent Weight Gain (___ Lbs)] : recent [unfilled] ~Ulb weight gain [Feeling Fatigued] : feeling fatigued [Shortness Of Breath] : shortness of breath [Dyspnea on exertion] : dyspnea during exertion [Lower Ext Edema] : no extremity edema [Palpitations] : palpitations [Skin: A Rash] : no rash: [Dizziness] : dizziness [Anxiety] : no anxiety [Easy Bleeding] : no tendency for easy bleeding [Easy Bruising] : no tendency for easy bruising [Negative] : Endocrine

## 2020-08-28 NOTE — DISCUSSION/SUMMARY
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

## 2020-08-28 NOTE — PHYSICAL EXAM
[General Appearance - Well Developed] : well developed [Normal Appearance] : normal appearance [Well Groomed] : well groomed [General Appearance - Well Nourished] : well nourished [General Appearance - In No Acute Distress] : no acute distress [No Deformities] : no deformities [Normal Conjunctiva] : the conjunctiva exhibited no abnormalities [Eyelids - No Xanthelasma] : the eyelids demonstrated no xanthelasmas [Respiration, Rhythm And Depth] : normal respiratory rhythm and effort [Auscultation Breath Sounds / Voice Sounds] : lungs were clear to auscultation bilaterally [5th Left ICS - MCL] : palpated at the 5th LICS in the midclavicular line [Exaggerated Use Of Accessory Muscles For Inspiration] : no accessory muscle use [Normal] : normal [No Precordial Heave] : no precordial heave was noted [Tachycardia] : tachycardic [Irregularly Irregular] : irregularly irregular [S3] : no S3 [Normal S1] : normal S1 [S4] : no S4 [Normal S2] : normal S2 [Click] : no click [No Murmur] : no murmurs heard [No Pitting Edema] : no pitting edema present [Abdomen Mass (___ Cm)] : no abdominal mass palpated [Bowel Sounds] : normal bowel sounds [Abdomen Tenderness] : non-tender [Abdomen Soft] : soft [Cyanosis, Localized] : no localized cyanosis [] : no rash [Skin Color & Pigmentation] : normal skin color and pigmentation [Oriented To Time, Place, And Person] : oriented to person, place, and time [Affect] : the affect was normal [Mood] : the mood was normal

## 2020-08-28 NOTE — HISTORY OF PRESENT ILLNESS
[FreeTextEntry1] : 70-yo female with h/o A-fib for several years (followed by several cardiologists and EP, failed cardioversion, refused ablation). Patient has been non-compliant with many medications, she stopped anticoagulation several months ago, stopped ASA several weeks ago.\par \par Patient fell at home, injured both ankles, had bilateral pain, swelling for 2-3 weeks. After that, ankle pain and swelling resolved but she developed progressive leg edema, increased SHETH, she sleeps with several pillows. \par \par 08/28/20:\par Since last visit, she reports dramatic improvement of dyspnea and palpitations.

## 2020-08-30 LAB
ALBUMIN SERPL ELPH-MCNC: 4.3 G/DL
ALP BLD-CCNC: 156 U/L
ALT SERPL-CCNC: 32 U/L
ANION GAP SERPL CALC-SCNC: 14 MMOL/L
AST SERPL-CCNC: 42 U/L
BASOPHILS # BLD AUTO: 0.04 K/UL
BASOPHILS NFR BLD AUTO: 0.5 %
BILIRUB SERPL-MCNC: 0.9 MG/DL
BUN SERPL-MCNC: 19 MG/DL
CALCIUM SERPL-MCNC: 10.3 MG/DL
CHLORIDE SERPL-SCNC: 100 MMOL/L
CO2 SERPL-SCNC: 30 MMOL/L
CREAT SERPL-MCNC: 1.1 MG/DL
EOSINOPHIL # BLD AUTO: 0.06 K/UL
EOSINOPHIL NFR BLD AUTO: 0.8 %
GLUCOSE SERPL-MCNC: 88 MG/DL
HCT VFR BLD CALC: 47.5 %
HGB BLD-MCNC: 14.7 G/DL
IMM GRANULOCYTES NFR BLD AUTO: 0.4 %
INR PPP: 1.1 RATIO
LYMPHOCYTES # BLD AUTO: 2.51 K/UL
LYMPHOCYTES NFR BLD AUTO: 31.5 %
MAN DIFF?: NORMAL
MCHC RBC-ENTMCNC: 29.1 PG
MCHC RBC-ENTMCNC: 30.9 G/DL
MCV RBC AUTO: 94.1 FL
MONOCYTES # BLD AUTO: 0.67 K/UL
MONOCYTES NFR BLD AUTO: 8.4 %
NEUTROPHILS # BLD AUTO: 4.66 K/UL
NEUTROPHILS NFR BLD AUTO: 58.4 %
PLATELET # BLD AUTO: 254 K/UL
POTASSIUM SERPL-SCNC: 5.4 MMOL/L
PROT SERPL-MCNC: 7.3 G/DL
PT BLD: 12.6 SEC
RBC # BLD: 5.05 M/UL
RBC # FLD: 14.5 %
SODIUM SERPL-SCNC: 144 MMOL/L
TSH SERPL-ACNC: 7.08 UIU/ML
WBC # FLD AUTO: 7.97 K/UL

## 2020-09-04 ENCOUNTER — APPOINTMENT (OUTPATIENT)
Dept: CARDIOLOGY | Facility: CLINIC | Age: 70
End: 2020-09-04
Payer: MEDICARE

## 2020-09-04 VITALS
WEIGHT: 140 LBS | SYSTOLIC BLOOD PRESSURE: 126 MMHG | BODY MASS INDEX: 25.76 KG/M2 | DIASTOLIC BLOOD PRESSURE: 70 MMHG | HEIGHT: 62 IN

## 2020-09-04 DIAGNOSIS — I50.23 ACUTE ON CHRONIC SYSTOLIC (CONGESTIVE) HEART FAILURE: ICD-10-CM

## 2020-09-04 PROCEDURE — 99214 OFFICE O/P EST MOD 30 MIN: CPT

## 2020-09-04 PROCEDURE — 93000 ELECTROCARDIOGRAM COMPLETE: CPT

## 2020-09-04 RX ORDER — POTASSIUM CHLORIDE 1500 MG/1
20 TABLET, EXTENDED RELEASE ORAL
Qty: 180 | Refills: 1 | Status: DISCONTINUED | COMMUNITY
Start: 2020-08-03 | End: 2020-09-04

## 2020-09-04 NOTE — PHYSICAL EXAM
[General Appearance - Well Developed] : well developed [Normal Appearance] : normal appearance [Well Groomed] : well groomed [General Appearance - Well Nourished] : well nourished [No Deformities] : no deformities [General Appearance - In No Acute Distress] : no acute distress [Eyelids - No Xanthelasma] : the eyelids demonstrated no xanthelasmas [Normal Conjunctiva] : the conjunctiva exhibited no abnormalities [Exaggerated Use Of Accessory Muscles For Inspiration] : no accessory muscle use [Respiration, Rhythm And Depth] : normal respiratory rhythm and effort [Normal] : normal [Auscultation Breath Sounds / Voice Sounds] : lungs were clear to auscultation bilaterally [5th Left ICS - MCL] : palpated at the 5th LICS in the midclavicular line [No Precordial Heave] : no precordial heave was noted [Normal S1] : normal S1 [Irregularly Irregular] : irregularly irregular [Tachycardia] : tachycardic [Normal S2] : normal S2 [No Murmur] : no murmurs heard [No Pitting Edema] : no pitting edema present [Bowel Sounds] : normal bowel sounds [Abdomen Mass (___ Cm)] : no abdominal mass palpated [Abdomen Tenderness] : non-tender [Abdomen Soft] : soft [Skin Color & Pigmentation] : normal skin color and pigmentation [Cyanosis, Localized] : no localized cyanosis [Affect] : the affect was normal [] : no rash [Oriented To Time, Place, And Person] : oriented to person, place, and time [Mood] : the mood was normal [S4] : no S4 [S3] : no S3 [Click] : no click

## 2020-09-04 NOTE — ASSESSMENT
[FreeTextEntry1] : Persistent A-fib, VR well controlled now.\par Patient appears euvolemic at this point.\par Severe LV dysfunction, may improve with rate control.\par Mild hyperkalemia.\par \par Plan:\par Continue Furosemide and K-dur once a day.\par Will recheck BW after next visit and try to add Entresto.\par Continue Eliquis, importance of anticoagulation discussed again.\par Continue Digoxin, reduce Amiodarone to 200 mg once a day.\par Cardioversion discussed with patient, she will think about it till next visit.\par F/u in 1 month.

## 2020-09-04 NOTE — HISTORY OF PRESENT ILLNESS
[FreeTextEntry1] : 70-yo female with h/o A-fib for several years (followed by several cardiologists and EP, failed cardioversion, refused ablation). Patient has been non-compliant with many medications, she stopped anticoagulation several months ago, stopped ASA several weeks ago.\par \par Patient fell at home, injured both ankles, had bilateral pain, swelling for 2-3 weeks. After that, ankle pain and swelling resolved but she developed progressive leg edema, increased SHETH, she sleeps with several pillows. \par \par 08/28/20:\par Since last visit, she reports dramatic improvement of dyspnea and palpitations.\par \par 09/04/20:\par Continues to improve, even after reducing Furosemide to once a day.\par

## 2020-09-04 NOTE — REVIEW OF SYSTEMS
[Recent Weight Gain (___ Lbs)] : recent [unfilled] ~Ulb weight gain [Shortness Of Breath] : shortness of breath [Feeling Fatigued] : feeling fatigued [Dyspnea on exertion] : dyspnea during exertion [Palpitations] : palpitations [Dizziness] : dizziness [Negative] : Neurological [Lower Ext Edema] : no extremity edema [Anxiety] : no anxiety [Skin: A Rash] : no rash: [Easy Bruising] : no tendency for easy bruising [Easy Bleeding] : no tendency for easy bleeding

## 2020-09-21 ENCOUNTER — RX RENEWAL (OUTPATIENT)
Age: 70
End: 2020-09-21

## 2020-10-02 ENCOUNTER — APPOINTMENT (OUTPATIENT)
Dept: CARDIOLOGY | Facility: CLINIC | Age: 70
End: 2020-10-02
Payer: MEDICARE

## 2020-10-02 VITALS
WEIGHT: 137 LBS | HEIGHT: 62 IN | BODY MASS INDEX: 25.21 KG/M2 | DIASTOLIC BLOOD PRESSURE: 70 MMHG | SYSTOLIC BLOOD PRESSURE: 100 MMHG

## 2020-10-02 PROCEDURE — 99214 OFFICE O/P EST MOD 30 MIN: CPT

## 2020-10-02 PROCEDURE — 93000 ELECTROCARDIOGRAM COMPLETE: CPT

## 2020-10-02 RX ORDER — DIGOXIN 125 UG/1
125 TABLET ORAL DAILY
Qty: 30 | Refills: 1 | Status: DISCONTINUED | COMMUNITY
Start: 2020-08-21 | End: 2020-10-02

## 2020-10-02 NOTE — HISTORY OF PRESENT ILLNESS
[FreeTextEntry1] : 70-yo female with h/o A-fib for several years (followed by several cardiologists and EP, failed cardioversion, refused ablation).\par \par Patient fell at home, injured both ankles, had bilateral pain, swelling for 2-3 weeks. After that, ankle pain and swelling resolved but she developed progressive leg edema, increased SHETH, she slept with several pillows. \par \par 08/28/20:\par Since last visit, she reports dramatic improvement of dyspnea and palpitations.\par \par 09/04/20:\par Continues to improve, even after reducing Furosemide to once a day.\par \par 10/01/20:\par Ran out of digoxin. Still denies SOB, palpitations.\par

## 2020-10-02 NOTE — PHYSICAL EXAM
[General Appearance - Well Developed] : well developed [Normal Appearance] : normal appearance [Well Groomed] : well groomed [General Appearance - Well Nourished] : well nourished [No Deformities] : no deformities [General Appearance - In No Acute Distress] : no acute distress [Normal Conjunctiva] : the conjunctiva exhibited no abnormalities [Eyelids - No Xanthelasma] : the eyelids demonstrated no xanthelasmas [Respiration, Rhythm And Depth] : normal respiratory rhythm and effort [Exaggerated Use Of Accessory Muscles For Inspiration] : no accessory muscle use [Auscultation Breath Sounds / Voice Sounds] : lungs were clear to auscultation bilaterally [5th Left ICS - MCL] : palpated at the 5th LICS in the midclavicular line [Normal] : normal [No Precordial Heave] : no precordial heave was noted [Tachycardia] : tachycardic [Irregularly Irregular] : irregularly irregular [Normal S1] : normal S1 [Normal S2] : normal S2 [S3] : no S3 [S4] : no S4 [Click] : no click [No Murmur] : no murmurs heard [No Pitting Edema] : no pitting edema present [Bowel Sounds] : normal bowel sounds [Abdomen Soft] : soft [Abdomen Tenderness] : non-tender [Abdomen Mass (___ Cm)] : no abdominal mass palpated [Cyanosis, Localized] : no localized cyanosis [Skin Color & Pigmentation] : normal skin color and pigmentation [] : no rash [Oriented To Time, Place, And Person] : oriented to person, place, and time [Affect] : the affect was normal [Mood] : the mood was normal

## 2020-10-02 NOTE — ASSESSMENT
[FreeTextEntry1] : A-fib, VR controlled now. \par CHF well compensated.\par Patient is still undecided about cardioversion.\par \par Plan:\par Continue Amiodarone, no reason to restart Digoxin.\par Continue Furosemide and K-dur once a day.\par Repeat 2D ECHO\par Will repeat BW after next visit.\par F/u in 1 month.\par \par Travis Head MD\par

## 2020-10-29 ENCOUNTER — APPOINTMENT (OUTPATIENT)
Dept: CARDIOLOGY | Facility: CLINIC | Age: 70
End: 2020-10-29

## 2020-11-10 ENCOUNTER — APPOINTMENT (OUTPATIENT)
Dept: CARDIOLOGY | Facility: CLINIC | Age: 70
End: 2020-11-10

## 2020-11-20 ENCOUNTER — APPOINTMENT (OUTPATIENT)
Dept: CARDIOLOGY | Facility: CLINIC | Age: 70
End: 2020-11-20

## 2021-01-20 NOTE — DISCHARGE NOTE ADULT - CARE PROVIDERS DIRECT ADDRESSES
Cerebrovascular accident (CVA), unspecified mechanism    Type 2 diabetes mellitus with other specified complication, unspecified whether long term insulin use    Urinary tract infection associated with indwelling urethral catheter, initial encounter    Ventricular arrhythmia  On Amiodarone  
,[email or web address]

## 2021-02-19 ENCOUNTER — APPOINTMENT (OUTPATIENT)
Dept: CARDIOLOGY | Facility: CLINIC | Age: 71
End: 2021-02-19

## 2021-03-15 ENCOUNTER — RX RENEWAL (OUTPATIENT)
Age: 71
End: 2021-03-15

## 2021-04-02 ENCOUNTER — APPOINTMENT (OUTPATIENT)
Dept: CARDIOLOGY | Facility: CLINIC | Age: 71
End: 2021-04-02

## 2021-05-17 ENCOUNTER — RX RENEWAL (OUTPATIENT)
Age: 71
End: 2021-05-17

## 2021-09-03 ENCOUNTER — RX RENEWAL (OUTPATIENT)
Age: 71
End: 2021-09-03

## 2021-10-11 ENCOUNTER — RX RENEWAL (OUTPATIENT)
Age: 71
End: 2021-10-11

## 2021-10-13 ENCOUNTER — TRANSCRIPTION ENCOUNTER (OUTPATIENT)
Age: 71
End: 2021-10-13

## 2021-12-05 ENCOUNTER — RX RENEWAL (OUTPATIENT)
Age: 71
End: 2021-12-05

## 2022-07-24 NOTE — REASON FOR VISIT
[Atrial Fibrillation] : atrial fibrillation [Follow-Up - Clinic] : a clinic follow-up of Yes [Dyspnea] : dyspnea [Heart Failure] : congestive heart failure

## 2022-09-12 ENCOUNTER — APPOINTMENT (OUTPATIENT)
Dept: CARDIOLOGY | Facility: CLINIC | Age: 72
End: 2022-09-12

## 2022-09-12 ENCOUNTER — RESULT CHARGE (OUTPATIENT)
Age: 72
End: 2022-09-12

## 2022-09-12 VITALS
WEIGHT: 154 LBS | SYSTOLIC BLOOD PRESSURE: 120 MMHG | HEIGHT: 62 IN | DIASTOLIC BLOOD PRESSURE: 80 MMHG | BODY MASS INDEX: 28.34 KG/M2 | RESPIRATION RATE: 16 BRPM | TEMPERATURE: 97.1 F | HEART RATE: 127 BPM

## 2022-09-12 PROCEDURE — 93000 ELECTROCARDIOGRAM COMPLETE: CPT

## 2022-09-12 PROCEDURE — 99214 OFFICE O/P EST MOD 30 MIN: CPT | Mod: 25

## 2022-09-12 RX ORDER — POTASSIUM CHLORIDE 1500 MG/1
20 TABLET, FILM COATED, EXTENDED RELEASE ORAL AS DIRECTED
Qty: 90 | Refills: 1 | Status: ACTIVE | COMMUNITY
Start: 2020-09-04 | End: 1900-01-01

## 2022-09-12 RX ORDER — APIXABAN 5 MG/1
5 TABLET, FILM COATED ORAL TWICE DAILY
Qty: 180 | Refills: 1 | Status: DISCONTINUED | COMMUNITY
End: 2022-09-12

## 2022-09-12 RX ORDER — AMIODARONE HYDROCHLORIDE 200 MG/1
200 TABLET ORAL DAILY
Qty: 90 | Refills: 1 | Status: DISCONTINUED | COMMUNITY
Start: 2020-08-21 | End: 2022-09-12

## 2022-09-12 NOTE — HISTORY OF PRESENT ILLNESS
[FreeTextEntry1] : 72-yo female with h/o A-fib for several years, cardiomyopathy (believed to be tachycardia-induced), systolic CHF.\par \par Patient was lost to f/u for 2 years. She denies palpitations, SOB now, takes Furosemide 2 times a week.\par \par She has stopped Eliquis and started ASA after someone she knew had a GIB on Eliquis. \par

## 2022-09-12 NOTE — PHYSICAL EXAM
[Well Developed] : well developed [Well Nourished] : well nourished [No Acute Distress] : no acute distress [Normal Conjunctiva] : normal conjunctiva [Normal Venous Pressure] : normal venous pressure [No Carotid Bruit] : no carotid bruit [No Murmur] : no murmur [No Rub] : no rub [No Gallop] : no gallop [Clear Lung Fields] : clear lung fields [Good Air Entry] : good air entry [No Respiratory Distress] : no respiratory distress  [Soft] : abdomen soft [Non Tender] : non-tender [Normal Bowel Sounds] : normal bowel sounds [Normal Gait] : normal gait [No Edema] : no edema [No Cyanosis] : no cyanosis [No Clubbing] : no clubbing [No Varicosities] : no varicosities [Normal PT B/L] : normal PT B/L [No Rash] : no rash [Moves all extremities] : moves all extremities [No Focal Deficits] : no focal deficits [Normal Speech] : normal speech [Alert and Oriented] : alert and oriented [Normal memory] : normal memory [de-identified] : irregular S1, S2

## 2022-09-12 NOTE — REVIEW OF SYSTEMS
[Blurry Vision] : no blurred vision [Dyspnea on exertion] : not dyspnea during exertion [Chest Discomfort] : no chest discomfort [Lower Ext Edema] : no extremity edema [Leg Claudication] : no intermittent leg claudication [Palpitations] : no palpitations [Anxiety] : no anxiety [Easy Bruising] : no tendency for easy bruising [Negative] : Neurological

## 2022-09-28 ENCOUNTER — APPOINTMENT (OUTPATIENT)
Dept: CARDIOLOGY | Facility: CLINIC | Age: 72
End: 2022-09-28

## 2022-09-28 PROCEDURE — 93306 TTE W/DOPPLER COMPLETE: CPT

## 2022-11-03 LAB
BASOPHILS # BLD AUTO: 0.07 K/UL
BASOPHILS NFR BLD AUTO: 0.8 %
EOSINOPHIL # BLD AUTO: 0.17 K/UL
EOSINOPHIL NFR BLD AUTO: 2 %
HCT VFR BLD CALC: 44.3 %
HGB BLD-MCNC: 14 G/DL
IMM GRANULOCYTES NFR BLD AUTO: 0.4 %
LYMPHOCYTES # BLD AUTO: 3.48 K/UL
LYMPHOCYTES NFR BLD AUTO: 40.7 %
MAN DIFF?: NORMAL
MCHC RBC-ENTMCNC: 29.3 PG
MCHC RBC-ENTMCNC: 31.6 G/DL
MCV RBC AUTO: 92.7 FL
MONOCYTES # BLD AUTO: 0.52 K/UL
MONOCYTES NFR BLD AUTO: 6.1 %
NEUTROPHILS # BLD AUTO: 4.29 K/UL
NEUTROPHILS NFR BLD AUTO: 50 %
PLATELET # BLD AUTO: 260 K/UL
RBC # BLD: 4.78 M/UL
RBC # FLD: 12.5 %
WBC # FLD AUTO: 8.56 K/UL

## 2022-11-07 LAB
ALBUMIN SERPL ELPH-MCNC: 4.3 G/DL
ALP BLD-CCNC: 124 U/L
ALT SERPL-CCNC: 34 U/L
ANION GAP SERPL CALC-SCNC: 10 MMOL/L
AST SERPL-CCNC: 38 U/L
BILIRUB SERPL-MCNC: 0.8 MG/DL
BUN SERPL-MCNC: 17 MG/DL
CALCIUM SERPL-MCNC: 9.6 MG/DL
CHLORIDE SERPL-SCNC: 103 MMOL/L
CHOLEST SERPL-MCNC: 214 MG/DL
CO2 SERPL-SCNC: 30 MMOL/L
CREAT SERPL-MCNC: 0.8 MG/DL
EGFR: 79 ML/MIN/1.73M2
ESTIMATED AVERAGE GLUCOSE: 137 MG/DL
GLUCOSE SERPL-MCNC: 88 MG/DL
HBA1C MFR BLD HPLC: 6.4 %
HDLC SERPL-MCNC: 68 MG/DL
LDLC SERPL CALC-MCNC: 121 MG/DL
NONHDLC SERPL-MCNC: 146 MG/DL
POTASSIUM SERPL-SCNC: 4.9 MMOL/L
PROT SERPL-MCNC: 7 G/DL
SODIUM SERPL-SCNC: 143 MMOL/L
TRIGL SERPL-MCNC: 127 MG/DL
TSH SERPL-ACNC: 4.28 UIU/ML

## 2022-11-10 ENCOUNTER — APPOINTMENT (OUTPATIENT)
Dept: CARDIOLOGY | Facility: CLINIC | Age: 72
End: 2022-11-10

## 2022-11-10 ENCOUNTER — RESULT CHARGE (OUTPATIENT)
Age: 72
End: 2022-11-10

## 2022-11-10 VITALS
DIASTOLIC BLOOD PRESSURE: 60 MMHG | BODY MASS INDEX: 29.44 KG/M2 | WEIGHT: 160 LBS | SYSTOLIC BLOOD PRESSURE: 100 MMHG | HEIGHT: 62 IN | HEART RATE: 112 BPM

## 2022-11-10 PROCEDURE — 99214 OFFICE O/P EST MOD 30 MIN: CPT | Mod: 25

## 2022-11-10 PROCEDURE — 93000 ELECTROCARDIOGRAM COMPLETE: CPT

## 2022-11-10 NOTE — ASSESSMENT
[FreeTextEntry1] : 73 y/o F, A-fib, VR uncontrolled again (missed her Metoprolol dose today).\par CHF well compensated.\par CHADS Vasc score 4.\par Moderate LV dysfunction, RV enlargement. Moderate MR, TR.\par \par \par Plan:\par Continue Furosemide and K-dur twice a week.\par Continue Metoprolol. Importance of compliance discussed again.\par Will try to control VR without Amiodarone to avoid long-term toxicity.\par Patient is refusing anticoagulation. Indication, risk explained again. Continue ASA for now.\par F/u in 4 months.\par \par Travis Head MD\par

## 2022-11-10 NOTE — HISTORY OF PRESENT ILLNESS
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 \par

## 2022-11-10 NOTE — REVIEW OF SYSTEMS
[Negative] : Neurological [Dyspnea on exertion] : dyspnea during exertion [Blurry Vision] : no blurred vision [Chest Discomfort] : no chest discomfort [Lower Ext Edema] : no extremity edema [Leg Claudication] : no intermittent leg claudication [Palpitations] : no palpitations [Anxiety] : no anxiety [Easy Bruising] : no tendency for easy bruising

## 2022-11-10 NOTE — PHYSICAL EXAM
[Well Developed] : well developed [Well Nourished] : well nourished [No Acute Distress] : no acute distress [Normal Conjunctiva] : normal conjunctiva [Normal Venous Pressure] : normal venous pressure [No Carotid Bruit] : no carotid bruit [No Murmur] : no murmur [No Rub] : no rub [No Gallop] : no gallop [Clear Lung Fields] : clear lung fields [Good Air Entry] : good air entry [No Respiratory Distress] : no respiratory distress  [Soft] : abdomen soft [Non Tender] : non-tender [Normal Bowel Sounds] : normal bowel sounds [Normal Gait] : normal gait [No Edema] : no edema [No Cyanosis] : no cyanosis [No Clubbing] : no clubbing [No Varicosities] : no varicosities [Normal PT B/L] : normal PT B/L [No Rash] : no rash [Moves all extremities] : moves all extremities [No Focal Deficits] : no focal deficits [Normal Speech] : normal speech [Alert and Oriented] : alert and oriented [Normal memory] : normal memory [de-identified] : irregular S1, S2

## 2022-11-10 NOTE — CARDIOLOGY SUMMARY
[de-identified] : 11/10/22:\par A-fib, /min, NSST changes. [de-identified] : 9/22:\par LVEF 39%, mod enlarged RV, mod MR,TR, mod elevated PA pressures.\par

## 2023-01-18 NOTE — ED ADULT NURSE NOTE - NSIMPLEMENTINTERV_GEN_ALL_ED
Camilla Camilla Implemented All Universal Safety Interventions:  Holder to call system. Call bell, personal items and telephone within reach. Instruct patient to call for assistance. Room bathroom lighting operational. Non-slip footwear when patient is off stretcher. Physically safe environment: no spills, clutter or unnecessary equipment. Stretcher in lowest position, wheels locked, appropriate side rails in place.

## 2023-05-01 ENCOUNTER — APPOINTMENT (OUTPATIENT)
Dept: CARDIOLOGY | Facility: CLINIC | Age: 73
End: 2023-05-01
Payer: MEDICARE

## 2023-05-01 VITALS
RESPIRATION RATE: 16 BRPM | BODY MASS INDEX: 29.63 KG/M2 | WEIGHT: 161 LBS | HEART RATE: 92 BPM | DIASTOLIC BLOOD PRESSURE: 74 MMHG | SYSTOLIC BLOOD PRESSURE: 102 MMHG | HEIGHT: 62 IN

## 2023-05-01 DIAGNOSIS — R60.9 EDEMA, UNSPECIFIED: ICD-10-CM

## 2023-05-01 DIAGNOSIS — Z00.00 ENCOUNTER FOR GENERAL ADULT MEDICAL EXAMINATION W/OUT ABNORMAL FINDINGS: ICD-10-CM

## 2023-05-01 PROCEDURE — 99214 OFFICE O/P EST MOD 30 MIN: CPT | Mod: 25

## 2023-05-01 PROCEDURE — 93000 ELECTROCARDIOGRAM COMPLETE: CPT

## 2023-05-01 NOTE — REVIEW OF SYSTEMS
[Negative] : Neurological [Chest Discomfort] : chest discomfort [Blurry Vision] : no blurred vision [Dyspnea on exertion] : not dyspnea during exertion [Lower Ext Edema] : no extremity edema [Leg Claudication] : no intermittent leg claudication [Palpitations] : no palpitations [Anxiety] : no anxiety [Easy Bruising] : no tendency for easy bruising

## 2023-05-01 NOTE — PHYSICAL EXAM
[Well Developed] : well developed [Well Nourished] : well nourished [No Acute Distress] : no acute distress [Normal Conjunctiva] : normal conjunctiva [Normal Venous Pressure] : normal venous pressure [No Carotid Bruit] : no carotid bruit [No Murmur] : no murmur [No Rub] : no rub [No Gallop] : no gallop [Clear Lung Fields] : clear lung fields [Good Air Entry] : good air entry [No Respiratory Distress] : no respiratory distress  [Soft] : abdomen soft [Non Tender] : non-tender [Normal Bowel Sounds] : normal bowel sounds [Normal Gait] : normal gait [No Edema] : no edema [No Cyanosis] : no cyanosis [No Clubbing] : no clubbing [No Varicosities] : no varicosities [Normal PT B/L] : normal PT B/L [No Rash] : no rash [Moves all extremities] : moves all extremities [No Focal Deficits] : no focal deficits [Normal Speech] : normal speech [Alert and Oriented] : alert and oriented [Normal memory] : normal memory [de-identified] : irregular S1, S2

## 2023-05-01 NOTE — HISTORY OF PRESENT ILLNESS
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 \par \par 05/01/2023: Patient presents for F/U cardiology visit. Patient is s/p DCCV 3-4 years. Remains in Afib, HR under control. Patient refuses AC, takes Aspirin. Reports occasional chest discomfort on exertion relieved by rest.\par \par , LDL 86, trig 340 (non-fasting).\par

## 2023-05-01 NOTE — ASSESSMENT
[FreeTextEntry1] : 73 y/o F, A-fib, VR controlled now.\par CHF well compensated.\par CHADS Vasc score 4.\par Moderate LV dysfunction, RV enlargement. Moderate MR, TR.\par Episodes of chest pain c/w angina.\par \par \par Plan:\par Continue Furosemide and K-dur twice a week.\par Continue Metoprolol. \par Patient is refusing anticoagulation. Indication, risk explained again. Continue ASA for now.\par Will obtain fasting blood work.\par Lexiscan MPI to r/o ischemia.\par F/u after the tests.\par \par Travis Head MD\par

## 2023-05-01 NOTE — CARDIOLOGY SUMMARY
[de-identified] : 05/01/2023: A-fib, VR 92 bpm, NSST changes.\par  [de-identified] : 9/22:\par LVEF 39%, mod enlarged RV, mod MR,TR, mod elevated PA pressures.\par

## 2023-08-26 ENCOUNTER — RX RENEWAL (OUTPATIENT)
Age: 73
End: 2023-08-26

## 2023-10-19 ENCOUNTER — APPOINTMENT (OUTPATIENT)
Dept: CARDIOLOGY | Facility: CLINIC | Age: 73
End: 2023-10-19
Payer: MEDICARE

## 2023-10-19 VITALS
HEIGHT: 62 IN | DIASTOLIC BLOOD PRESSURE: 72 MMHG | HEART RATE: 106 BPM | BODY MASS INDEX: 28.34 KG/M2 | SYSTOLIC BLOOD PRESSURE: 110 MMHG | WEIGHT: 154 LBS

## 2023-10-19 PROCEDURE — 93000 ELECTROCARDIOGRAM COMPLETE: CPT

## 2023-10-19 PROCEDURE — 99214 OFFICE O/P EST MOD 30 MIN: CPT | Mod: 25

## 2023-10-19 RX ORDER — METOPROLOL TARTRATE 50 MG/1
50 TABLET, FILM COATED ORAL TWICE DAILY
Refills: 0 | Status: ACTIVE | COMMUNITY

## 2023-11-02 ENCOUNTER — APPOINTMENT (OUTPATIENT)
Dept: CARDIOLOGY | Facility: CLINIC | Age: 73
End: 2023-11-02

## 2023-11-19 ENCOUNTER — INPATIENT (INPATIENT)
Facility: HOSPITAL | Age: 73
LOS: 4 days | Discharge: HOME | DRG: 291 | End: 2023-11-24
Attending: STUDENT IN AN ORGANIZED HEALTH CARE EDUCATION/TRAINING PROGRAM | Admitting: STUDENT IN AN ORGANIZED HEALTH CARE EDUCATION/TRAINING PROGRAM
Payer: MEDICARE

## 2023-11-19 VITALS
SYSTOLIC BLOOD PRESSURE: 134 MMHG | HEART RATE: 83 BPM | RESPIRATION RATE: 20 BRPM | TEMPERATURE: 98 F | OXYGEN SATURATION: 99 % | DIASTOLIC BLOOD PRESSURE: 70 MMHG

## 2023-11-19 PROCEDURE — 99285 EMERGENCY DEPT VISIT HI MDM: CPT

## 2023-11-19 RX ORDER — IPRATROPIUM/ALBUTEROL SULFATE 18-103MCG
3 AEROSOL WITH ADAPTER (GRAM) INHALATION ONCE
Refills: 0 | Status: COMPLETED | OUTPATIENT
Start: 2023-11-19 | End: 2023-11-19

## 2023-11-19 NOTE — ED ADULT TRIAGE NOTE - GLASGOW COMA SCALE: BEST VERBAL RESPONSE, MLM
Care Management Discharge Note    Discharge Date: 03/18/2023  Discharge Disposition: Home with family  Discharge Services: OP specialty  Discharge DME: None  Discharge Transportation: family or friend will provide  Private pay costs discussed: Not applicable  Education Provided on the Discharge Plan:  yes  Persons Notified of Discharge Plans: pt; bedside RN; Charge RN  Patient/Family in Agreement with the Plan:  yes    Handoff Referral Completed: Yes LU[facility] - AGUSTIN Martinez    Additional Information:  RNCC spoke with pt at the bedside, confirmed transportation to be anticipated via spouse, Alma. Pt declined any additional needs, several consults and pertinent follow-ups arranged. No PTA services needed for resumption. RNCC to conclude following pt upon safe departure from floor and facility.       Raffi Jarrett RN BSN  5B RNCC  Phone: [phone]  Pager: [phone]    For Weekend & Holiday on call RN Care Coordinator:  (Tasks: Home care, home infusion, medical equipment, transportation, IMM & DUGGAN forms, etc.)  [place] (0800 [phone] 1630) Saturday and Sunday    Units: 4A, 4C, 4E, 5A and 5B: [phone]    Units: 6A, 6B, 6C, 6D: [phone]    Units: 7A, 7B, 7C, 7D, and 5C: [phone]    [facility] ([phone]) Saturday and Sunday    Units: 5 Ortho, 8A, 10 ICU, & Pediatric Units: [phone]       (V5) oriented

## 2023-11-20 DIAGNOSIS — I48.91 UNSPECIFIED ATRIAL FIBRILLATION: ICD-10-CM

## 2023-11-20 LAB
ALBUMIN SERPL ELPH-MCNC: 3.8 G/DL — SIGNIFICANT CHANGE UP (ref 3.5–5.2)
ALBUMIN SERPL ELPH-MCNC: 3.8 G/DL — SIGNIFICANT CHANGE UP (ref 3.5–5.2)
ALP SERPL-CCNC: 108 U/L — SIGNIFICANT CHANGE UP (ref 30–115)
ALP SERPL-CCNC: 108 U/L — SIGNIFICANT CHANGE UP (ref 30–115)
ALT FLD-CCNC: 36 U/L — SIGNIFICANT CHANGE UP (ref 0–41)
ALT FLD-CCNC: 36 U/L — SIGNIFICANT CHANGE UP (ref 0–41)
ANION GAP SERPL CALC-SCNC: 12 MMOL/L — SIGNIFICANT CHANGE UP (ref 7–14)
ANION GAP SERPL CALC-SCNC: 12 MMOL/L — SIGNIFICANT CHANGE UP (ref 7–14)
AST SERPL-CCNC: 46 U/L — HIGH (ref 0–41)
AST SERPL-CCNC: 46 U/L — HIGH (ref 0–41)
BASOPHILS # BLD AUTO: 0.04 K/UL — SIGNIFICANT CHANGE UP (ref 0–0.2)
BASOPHILS # BLD AUTO: 0.04 K/UL — SIGNIFICANT CHANGE UP (ref 0–0.2)
BASOPHILS NFR BLD AUTO: 0.4 % — SIGNIFICANT CHANGE UP (ref 0–1)
BASOPHILS NFR BLD AUTO: 0.4 % — SIGNIFICANT CHANGE UP (ref 0–1)
BILIRUB SERPL-MCNC: 1.2 MG/DL — SIGNIFICANT CHANGE UP (ref 0.2–1.2)
BILIRUB SERPL-MCNC: 1.2 MG/DL — SIGNIFICANT CHANGE UP (ref 0.2–1.2)
BUN SERPL-MCNC: 25 MG/DL — HIGH (ref 10–20)
BUN SERPL-MCNC: 25 MG/DL — HIGH (ref 10–20)
CALCIUM SERPL-MCNC: 9.9 MG/DL — SIGNIFICANT CHANGE UP (ref 8.4–10.5)
CALCIUM SERPL-MCNC: 9.9 MG/DL — SIGNIFICANT CHANGE UP (ref 8.4–10.5)
CHLORIDE SERPL-SCNC: 100 MMOL/L — SIGNIFICANT CHANGE UP (ref 98–110)
CHLORIDE SERPL-SCNC: 100 MMOL/L — SIGNIFICANT CHANGE UP (ref 98–110)
CO2 SERPL-SCNC: 25 MMOL/L — SIGNIFICANT CHANGE UP (ref 17–32)
CO2 SERPL-SCNC: 25 MMOL/L — SIGNIFICANT CHANGE UP (ref 17–32)
CREAT SERPL-MCNC: 0.9 MG/DL — SIGNIFICANT CHANGE UP (ref 0.7–1.5)
CREAT SERPL-MCNC: 0.9 MG/DL — SIGNIFICANT CHANGE UP (ref 0.7–1.5)
EGFR: 68 ML/MIN/1.73M2 — SIGNIFICANT CHANGE UP
EGFR: 68 ML/MIN/1.73M2 — SIGNIFICANT CHANGE UP
EOSINOPHIL # BLD AUTO: 0.04 K/UL — SIGNIFICANT CHANGE UP (ref 0–0.7)
EOSINOPHIL # BLD AUTO: 0.04 K/UL — SIGNIFICANT CHANGE UP (ref 0–0.7)
EOSINOPHIL NFR BLD AUTO: 0.4 % — SIGNIFICANT CHANGE UP (ref 0–8)
EOSINOPHIL NFR BLD AUTO: 0.4 % — SIGNIFICANT CHANGE UP (ref 0–8)
GLUCOSE SERPL-MCNC: 126 MG/DL — HIGH (ref 70–99)
GLUCOSE SERPL-MCNC: 126 MG/DL — HIGH (ref 70–99)
HCT VFR BLD CALC: 43.7 % — SIGNIFICANT CHANGE UP (ref 37–47)
HCT VFR BLD CALC: 43.7 % — SIGNIFICANT CHANGE UP (ref 37–47)
HGB BLD-MCNC: 14.1 G/DL — SIGNIFICANT CHANGE UP (ref 12–16)
HGB BLD-MCNC: 14.1 G/DL — SIGNIFICANT CHANGE UP (ref 12–16)
IMM GRANULOCYTES NFR BLD AUTO: 0.4 % — HIGH (ref 0.1–0.3)
IMM GRANULOCYTES NFR BLD AUTO: 0.4 % — HIGH (ref 0.1–0.3)
LYMPHOCYTES # BLD AUTO: 1.78 K/UL — SIGNIFICANT CHANGE UP (ref 1.2–3.4)
LYMPHOCYTES # BLD AUTO: 1.78 K/UL — SIGNIFICANT CHANGE UP (ref 1.2–3.4)
LYMPHOCYTES # BLD AUTO: 18.1 % — LOW (ref 20.5–51.1)
LYMPHOCYTES # BLD AUTO: 18.1 % — LOW (ref 20.5–51.1)
MAGNESIUM SERPL-MCNC: 2.1 MG/DL — SIGNIFICANT CHANGE UP (ref 1.8–2.4)
MAGNESIUM SERPL-MCNC: 2.1 MG/DL — SIGNIFICANT CHANGE UP (ref 1.8–2.4)
MCHC RBC-ENTMCNC: 29.8 PG — SIGNIFICANT CHANGE UP (ref 27–31)
MCHC RBC-ENTMCNC: 29.8 PG — SIGNIFICANT CHANGE UP (ref 27–31)
MCHC RBC-ENTMCNC: 32.3 G/DL — SIGNIFICANT CHANGE UP (ref 32–37)
MCHC RBC-ENTMCNC: 32.3 G/DL — SIGNIFICANT CHANGE UP (ref 32–37)
MCV RBC AUTO: 92.4 FL — SIGNIFICANT CHANGE UP (ref 81–99)
MCV RBC AUTO: 92.4 FL — SIGNIFICANT CHANGE UP (ref 81–99)
MONOCYTES # BLD AUTO: 0.76 K/UL — HIGH (ref 0.1–0.6)
MONOCYTES # BLD AUTO: 0.76 K/UL — HIGH (ref 0.1–0.6)
MONOCYTES NFR BLD AUTO: 7.7 % — SIGNIFICANT CHANGE UP (ref 1.7–9.3)
MONOCYTES NFR BLD AUTO: 7.7 % — SIGNIFICANT CHANGE UP (ref 1.7–9.3)
NEUTROPHILS # BLD AUTO: 7.18 K/UL — HIGH (ref 1.4–6.5)
NEUTROPHILS # BLD AUTO: 7.18 K/UL — HIGH (ref 1.4–6.5)
NEUTROPHILS NFR BLD AUTO: 73 % — SIGNIFICANT CHANGE UP (ref 42.2–75.2)
NEUTROPHILS NFR BLD AUTO: 73 % — SIGNIFICANT CHANGE UP (ref 42.2–75.2)
NRBC # BLD: 0 /100 WBCS — SIGNIFICANT CHANGE UP (ref 0–0)
NRBC # BLD: 0 /100 WBCS — SIGNIFICANT CHANGE UP (ref 0–0)
NT-PROBNP SERPL-SCNC: 5034 PG/ML — HIGH (ref 0–300)
NT-PROBNP SERPL-SCNC: 5034 PG/ML — HIGH (ref 0–300)
PLATELET # BLD AUTO: 211 K/UL — SIGNIFICANT CHANGE UP (ref 130–400)
PLATELET # BLD AUTO: 211 K/UL — SIGNIFICANT CHANGE UP (ref 130–400)
PMV BLD: 10.9 FL — HIGH (ref 7.4–10.4)
PMV BLD: 10.9 FL — HIGH (ref 7.4–10.4)
POTASSIUM SERPL-MCNC: 4.5 MMOL/L — SIGNIFICANT CHANGE UP (ref 3.5–5)
POTASSIUM SERPL-MCNC: 4.5 MMOL/L — SIGNIFICANT CHANGE UP (ref 3.5–5)
POTASSIUM SERPL-SCNC: 4.5 MMOL/L — SIGNIFICANT CHANGE UP (ref 3.5–5)
POTASSIUM SERPL-SCNC: 4.5 MMOL/L — SIGNIFICANT CHANGE UP (ref 3.5–5)
PROT SERPL-MCNC: 6.3 G/DL — SIGNIFICANT CHANGE UP (ref 6–8)
PROT SERPL-MCNC: 6.3 G/DL — SIGNIFICANT CHANGE UP (ref 6–8)
RBC # BLD: 4.73 M/UL — SIGNIFICANT CHANGE UP (ref 4.2–5.4)
RBC # BLD: 4.73 M/UL — SIGNIFICANT CHANGE UP (ref 4.2–5.4)
RBC # FLD: 13.5 % — SIGNIFICANT CHANGE UP (ref 11.5–14.5)
RBC # FLD: 13.5 % — SIGNIFICANT CHANGE UP (ref 11.5–14.5)
SODIUM SERPL-SCNC: 137 MMOL/L — SIGNIFICANT CHANGE UP (ref 135–146)
SODIUM SERPL-SCNC: 137 MMOL/L — SIGNIFICANT CHANGE UP (ref 135–146)
TROPONIN T SERPL-MCNC: <0.01 NG/ML — SIGNIFICANT CHANGE UP
TROPONIN T SERPL-MCNC: <0.01 NG/ML — SIGNIFICANT CHANGE UP
WBC # BLD: 9.84 K/UL — SIGNIFICANT CHANGE UP (ref 4.8–10.8)
WBC # BLD: 9.84 K/UL — SIGNIFICANT CHANGE UP (ref 4.8–10.8)
WBC # FLD AUTO: 9.84 K/UL — SIGNIFICANT CHANGE UP (ref 4.8–10.8)
WBC # FLD AUTO: 9.84 K/UL — SIGNIFICANT CHANGE UP (ref 4.8–10.8)

## 2023-11-20 PROCEDURE — 93306 TTE W/DOPPLER COMPLETE: CPT | Mod: 26

## 2023-11-20 PROCEDURE — 80061 LIPID PANEL: CPT

## 2023-11-20 PROCEDURE — 84484 ASSAY OF TROPONIN QUANT: CPT

## 2023-11-20 PROCEDURE — 71045 X-RAY EXAM CHEST 1 VIEW: CPT | Mod: 26

## 2023-11-20 PROCEDURE — 85610 PROTHROMBIN TIME: CPT

## 2023-11-20 PROCEDURE — 36415 COLL VENOUS BLD VENIPUNCTURE: CPT

## 2023-11-20 PROCEDURE — 85652 RBC SED RATE AUTOMATED: CPT

## 2023-11-20 PROCEDURE — 71250 CT THORAX DX C-: CPT

## 2023-11-20 PROCEDURE — 86140 C-REACTIVE PROTEIN: CPT

## 2023-11-20 PROCEDURE — 85730 THROMBOPLASTIN TIME PARTIAL: CPT

## 2023-11-20 PROCEDURE — 80076 HEPATIC FUNCTION PANEL: CPT

## 2023-11-20 PROCEDURE — 83735 ASSAY OF MAGNESIUM: CPT

## 2023-11-20 PROCEDURE — 93010 ELECTROCARDIOGRAM REPORT: CPT

## 2023-11-20 PROCEDURE — 93306 TTE W/DOPPLER COMPLETE: CPT

## 2023-11-20 PROCEDURE — 80048 BASIC METABOLIC PNL TOTAL CA: CPT

## 2023-11-20 PROCEDURE — 99222 1ST HOSP IP/OBS MODERATE 55: CPT

## 2023-11-20 PROCEDURE — 0225U NFCT DS DNA&RNA 21 SARSCOV2: CPT

## 2023-11-20 PROCEDURE — 94640 AIRWAY INHALATION TREATMENT: CPT

## 2023-11-20 PROCEDURE — 83036 HEMOGLOBIN GLYCOSYLATED A1C: CPT

## 2023-11-20 PROCEDURE — 93010 ELECTROCARDIOGRAM REPORT: CPT | Mod: 77

## 2023-11-20 PROCEDURE — 85027 COMPLETE CBC AUTOMATED: CPT

## 2023-11-20 PROCEDURE — 80053 COMPREHEN METABOLIC PANEL: CPT

## 2023-11-20 PROCEDURE — 86803 HEPATITIS C AB TEST: CPT

## 2023-11-20 PROCEDURE — 80162 ASSAY OF DIGOXIN TOTAL: CPT

## 2023-11-20 PROCEDURE — 93005 ELECTROCARDIOGRAM TRACING: CPT

## 2023-11-20 RX ORDER — DILTIAZEM HCL 120 MG
10 CAPSULE, EXT RELEASE 24 HR ORAL ONCE
Refills: 0 | Status: COMPLETED | OUTPATIENT
Start: 2023-11-20 | End: 2023-11-20

## 2023-11-20 RX ORDER — ACETAMINOPHEN 500 MG
650 TABLET ORAL EVERY 6 HOURS
Refills: 0 | Status: DISCONTINUED | OUTPATIENT
Start: 2023-11-20 | End: 2023-11-24

## 2023-11-20 RX ORDER — FUROSEMIDE 40 MG
40 TABLET ORAL ONCE
Refills: 0 | Status: COMPLETED | OUTPATIENT
Start: 2023-11-20 | End: 2023-11-20

## 2023-11-20 RX ORDER — FUROSEMIDE 40 MG
1 TABLET ORAL
Refills: 0 | DISCHARGE

## 2023-11-20 RX ORDER — SODIUM CHLORIDE 9 MG/ML
1000 INJECTION, SOLUTION INTRAVENOUS ONCE
Refills: 0 | Status: COMPLETED | OUTPATIENT
Start: 2023-11-20 | End: 2023-11-20

## 2023-11-20 RX ORDER — AMIODARONE HYDROCHLORIDE 400 MG/1
1 TABLET ORAL
Qty: 450 | Refills: 0 | Status: DISCONTINUED | OUTPATIENT
Start: 2023-11-20 | End: 2023-11-21

## 2023-11-20 RX ORDER — METOPROLOL TARTRATE 50 MG
50 TABLET ORAL EVERY 6 HOURS
Refills: 0 | Status: DISCONTINUED | OUTPATIENT
Start: 2023-11-20 | End: 2023-11-23

## 2023-11-20 RX ORDER — DILTIAZEM HCL 120 MG
30 CAPSULE, EXT RELEASE 24 HR ORAL ONCE
Refills: 0 | Status: COMPLETED | OUTPATIENT
Start: 2023-11-20 | End: 2023-11-20

## 2023-11-20 RX ORDER — APIXABAN 2.5 MG/1
5 TABLET, FILM COATED ORAL EVERY 12 HOURS
Refills: 0 | Status: DISCONTINUED | OUTPATIENT
Start: 2023-11-20 | End: 2023-11-24

## 2023-11-20 RX ORDER — AMIODARONE HYDROCHLORIDE 400 MG/1
0.5 TABLET ORAL
Qty: 450 | Refills: 0 | Status: DISCONTINUED | OUTPATIENT
Start: 2023-11-21 | End: 2023-11-21

## 2023-11-20 RX ORDER — AMIODARONE HYDROCHLORIDE 400 MG/1
150 TABLET ORAL ONCE
Refills: 0 | Status: COMPLETED | OUTPATIENT
Start: 2023-11-20 | End: 2023-11-20

## 2023-11-20 RX ORDER — METOPROLOL TARTRATE 50 MG
50 TABLET ORAL EVERY 12 HOURS
Refills: 0 | Status: DISCONTINUED | OUTPATIENT
Start: 2023-11-20 | End: 2023-11-20

## 2023-11-20 RX ADMIN — Medication 30 MILLIGRAM(S): at 02:18

## 2023-11-20 RX ADMIN — Medication 50 MILLIGRAM(S): at 11:14

## 2023-11-20 RX ADMIN — APIXABAN 5 MILLIGRAM(S): 2.5 TABLET, FILM COATED ORAL at 06:19

## 2023-11-20 RX ADMIN — APIXABAN 5 MILLIGRAM(S): 2.5 TABLET, FILM COATED ORAL at 17:09

## 2023-11-20 RX ADMIN — AMIODARONE HYDROCHLORIDE 33.3 MG/MIN: 400 TABLET ORAL at 18:55

## 2023-11-20 RX ADMIN — AMIODARONE HYDROCHLORIDE 600 MILLIGRAM(S): 400 TABLET ORAL at 18:26

## 2023-11-20 RX ADMIN — Medication 50 MILLIGRAM(S): at 06:19

## 2023-11-20 RX ADMIN — Medication 50 MILLIGRAM(S): at 23:05

## 2023-11-20 RX ADMIN — Medication 650 MILLIGRAM(S): at 18:25

## 2023-11-20 RX ADMIN — Medication 100 MILLIGRAM(S): at 11:00

## 2023-11-20 RX ADMIN — Medication 40 MILLIGRAM(S): at 04:20

## 2023-11-20 RX ADMIN — Medication 100 MILLIGRAM(S): at 23:05

## 2023-11-20 RX ADMIN — Medication 10 MILLIGRAM(S): at 01:45

## 2023-11-20 RX ADMIN — SODIUM CHLORIDE 1000 MILLILITER(S): 9 INJECTION, SOLUTION INTRAVENOUS at 01:44

## 2023-11-20 RX ADMIN — Medication 3 MILLILITER(S): at 00:36

## 2023-11-20 RX ADMIN — Medication 50 MILLIGRAM(S): at 17:09

## 2023-11-20 NOTE — ED PROVIDER NOTE - PHYSICAL EXAMINATION
Vital Signs: I have reviewed the initial vital signs.  Constitutional: well-nourished, appears stated age,   HEENT: Airway patent, moist MM, EOMI,   CV: irregular, tachycardic  Lungs: Clear to ascultation bilaterally, no increased work of breathing. (+) cough  ABD: Non-tender, Non-distended,   MSK: Neck supple, nontender, normal range of motion,   INTEG: Skin warm, dry, no rash.  NEURO: A&Ox3, moving all extremities, normal speech

## 2023-11-20 NOTE — PATIENT PROFILE ADULT - FALL HARM RISK - HARM RISK INTERVENTIONS

## 2023-11-20 NOTE — H&P ADULT - NSHPLABSRESULTS_GEN_ALL_CORE
-  ECG (11/20/2023): afib, RVR     - Echo TTE ( 9/28/2022) : Moderate decreased global Left ventricular systolic dysfunction, normal left ventricular cavity. The LV diastolic could not be assessed in this study. the LV EF is calculated is 39%, moderate enlarge RV, moderate reduced RV systolic function, mildly dilated LA/RA. Moderate TR/MR,  and moderate elevated pulmonary arterial systolic pressure.     TTE ( 1/5/2019)  : Summary:   1. LV Ejection Fraction by Ring's Method with a biplane EF of 54 %.   2. Normal left ventricular internal cavity size.   3. Normal left atrial size.   4. Normal right atrial size.   5. There is no evidence of pericardial effusion.   6. Mild mitral annular calcification.   7. Thickening of the anterior and posterior mitral valve leaflets.   8. Pulmonic valve regurgitation.    - Radiology: CXR ( 11/20/2023) ***     - Labs:                        14.1   9.84  )-----------( 211      ( 20 Nov 2023 01:46 )             43.7     11-20    137  |  100  |  25<H>  ----------------------------<  126<H>  4.5   |  25  |  0.9    Ca    9.9      20 Nov 2023 01:46  Mg     2.1     11-20    TPro  6.3  /  Alb  3.8  /  TBili  1.2  /  DBili  x   /  AST  46<H>  /  ALT  36  /  AlkPhos  108  11-20    LIVER FUNCTIONS - ( 20 Nov 2023 01:46 )  Alb: 3.8 g/dL / Pro: 6.3 g/dL / ALK PHOS: 108 U/L / ALT: 36 U/L / AST: 46 U/L / GGT: x             Troponin T, Serum: <0.01 ng/mL (11-20 @ 01:46)    CARDIAC MARKERS ( 20 Nov 2023 01:46 )  x     / <0.01 ng/mL / x     / x     / x            Lactate Trend    Urinalysis Basic - ( 20 Nov 2023 01:46 )    Color: x / Appearance: x / SG: x / pH: x  Gluc: 126 mg/dL / Ketone: x  / Bili: x / Urobili: x   Blood: x / Protein: x / Nitrite: x   Leuk Esterase: x / RBC: x / WBC x   Sq Epi: x / Non Sq Epi: x / Bacteria: x

## 2023-11-20 NOTE — H&P ADULT - NSHPPHYSICALEXAM_GEN_ALL_CORE
VITALS:   T(C): 37.1 (11-20-23 @ 01:43), Max: 37.1 (11-20-23 @ 01:43)  HR: 94 (11-20-23 @ 03:17) (83 - 145)  BP: 124/76 (11-20-23 @ 03:17) (122/80 - 134/70)  RR: 18 (11-20-23 @ 03:17) (18 - 20)  SpO2: 99% (11-20-23 @ 03:17) (98% - 99%)    GENERAL: NAD, lying in bed comfortably  HEAD:  Atraumatic, normocephalic  EYES: EOMI, PERRLA, conjunctiva and sclera clear  ENT: Moist mucous membranes  NECK: Supple, no JVD  HEART: Regular rate and rhythm, no murmurs, rubs, or gallops  LUNGS: bibasilar crackles   ABDOMEN: Soft, nontender, nondistended, +BS  EXTREMITIES: 1+ pitting edema   NERVOUS SYSTEM:  A&Ox3, no focal deficits   SKIN: No rashes or lesions

## 2023-11-20 NOTE — PATIENT PROFILE ADULT - VISION (WITH CORRECTIVE LENSES IF THE PATIENT USUALLY WEARS THEM):
Patient uses eyeglasses/Normal vision: sees adequately in most situations; can see medication labels, newsprint

## 2023-11-20 NOTE — H&P ADULT - ASSESSMENT
72 y/o female with pMHx of Paroxysmal afib/aflutter s/p DCCV ( refused AC, currently on asa ) , CHF, HFrEF ( LVEF 39% )  with worsening SEHTH and cough, now admitted to cards tele for acute on chronic CHF exacerbation.     # acute on chronic CHF exacerbation   -admit to cards tele   -trend trop, 1st negative   -continue telemetry monitoring  -vitals as per floor protocol  -EKG revealing   -CXR with pulmonary congestion   -elevated BNP   -will give lasix 40 mg IV , will determine daily diuresis until euvolemic is achieved   -monitor electrolyte ,maintain K >4 , mg >2 and supple as needed  -daily weight  -strict I&O  -TTE       #Aflutter s/p IV Cardizem and PO cardizem - Rate controlled now   - CHADSCVASC 4  -Benefit and risk of AC, discussed with patient and agrees now to AC   - will start pt on Eliquis 5mg q12 for A/C  - Continue  metoprolol to 50 BID    #HTN   -Continue with metroprolol 50 mg q12 for now     FULL CODE   Cardiac diet   DVT/GI PPX: Eliquis and Protonix    Disp : Pending IV duiresis    72 y/o female with pMHx of Paroxysmal afib/aflutter s/p DCCV ( refused AC, currently on asa ) , CHF, HFrEF ( LVEF 39% )  with worsening SHETH and cough, now admitted to cards tele for acute on chronic CHF exacerbation.     # acute on chronic CHF exacerbation   -admit to cards tele   -trend trop, 1st negative   -continue telemetry monitoring  -vitals as per floor protocol  -EKG revealing afib, RVR   -FU CXR reading   -elevated BNP   -will give lasix 40 mg IV , will determine daily diuresis until euvolemic is achieved   -monitor electrolyte ,maintain K >4 , mg >2 and supple as needed  -daily weight  -strict I&O  -TTE       #Aflutter s/p IV Cardizem and PO Cardizem - Rate controlled now   - CHADSCVASC 4  -Benefit and risk of AC, discussed with patient and agrees now to AC   - will start pt on Eliquis 5mg q12 for A/C  - Continue  metoprolol to 50 BID    #HTN   -Continue with Metroprolol 50 mg q12 for now     FULL CODE   Cardiac diet   DVT/GI PPX: Eliquis and Protonix    Disp : Pending IV diuresis

## 2023-11-20 NOTE — H&P ADULT - NSICDXPASTMEDICALHX_GEN_ALL_CORE_FT
PAST MEDICAL HISTORY:  Arrhythmia     Chronic systolic congestive heart failure     Permanent atrial fibrillation

## 2023-11-20 NOTE — ED ADULT NURSE NOTE - NSFALLHARMRISKINTERV_ED_ALL_ED

## 2023-11-20 NOTE — H&P ADULT - TIME BILLING
Chart review, bedside evaluation, coordination of care with patient's primary cardiologist, discussion of care with the patient.

## 2023-11-20 NOTE — H&P ADULT - NS ATTEND AMEND GEN_ALL_CORE FT
In brief, 73F, hx paroxysmal AF/AFL not on AC (prior DCCV), HFrEF thought 2/2 TIC, and hypertension admitted for acute on chronic systolic heart failure complicated by rapid atrial fibrillation in the setting of probable upper respiratory infection.    Labs:  - Hgb 14.1, Plt 211  - Cr 0.9, K 4.5, BUN 25  - Trop <0.01, pBNP 5034    Imaging:  - CXR grossly unremarkable  - TTE EF 20-25, mod LA dilatation, mild mod MR, mild pHTN    Impression:  (1) Probable URI  (2) Acute on chronic systolic heart failure  (3) Rapid atrial fibrillation  (4) Hypertension    Plan:  - Discussed YOLANDA/DCCV in detail. Patient is not amenable at this time.  - Discussed LHC for ischemic evaluation in light of worsened EF. She is interested but wishes to await recovery of her URI before proceeding.  - Rate control with BB +/- amiodarone. Avoid CCB in light of severe LV dysfunction.  - AC for thromboembolic prophylaxis  - RVP and flu swab pending In brief, 73F, hx paroxysmal AF/AFL not on AC (prior DCCV), HFrEF thought 2/2 TIC, and hypertension admitted for acute on chronic systolic heart failure complicated by rapid atrial fibrillation in the setting of probable upper respiratory infection.    Labs:  - Hgb 14.1, Plt 211  - Cr 0.9, K 4.5, BUN 25  - Trop <0.01, pBNP 5034    Imaging:  - CXR grossly unremarkable  - TTE EF 20-25, mod LA dilatation, mild mod MR, mild pHTN    Impression:  (1) Probable URI  (2) Acute on chronic systolic heart failure  (3) Rapid atrial fibrillation  (4) Hypertension    Plan:  - Discussed YOLANDA/DCCV in detail. Patient is not amenable at this time.  - Discussed C for ischemic evaluation in light of worsened EF. She is interested but wishes to await recovery of her URI before proceeding.  - Rate control with BB +/- digoxin. Avoid CCB in light of severe LV dysfunction. Avoid amiodarone to avoid pharmacologic cardioversion.  - AC for thromboembolic prophylaxis  - RVP and flu swab pending

## 2023-11-20 NOTE — H&P ADULT - HISTORY OF PRESENT ILLNESS
74 y/o female with pMHx of Paroxysmal afib/aflutter s/p DCCV ( refused AC due to side effect, currently on asa ) , CHF, HFrEF ( LVEF 39% ) , HTN presents to the ED with worsening cough and SHETH for the past week. Patient report feeling weak and been coughing for a week, mild sputum production with SHETH with a few steps. patient report chest tightness with cough but denies chest pain on exertion. patient admits to eating salty fish and recently missed her stress test appointment due to  not feeling well and being admitted to the hospital for pneumonia/ Patient admits to not taking Eliquis due to side effect of GI bleed, denies any bleeding or hematuria. exercise tolerance decreased to a few steps due to SHETH and sleeping on 2-3 pillows for orthopnea, denies PND or leg swelling but stated has Lasix which she takes prn when her leg swells up, last dose of Lasix was 2 days ago. Patient denies fever, chills, abdominal pain, nausea, vomiting, diarrhea, slurred speech, recent travel, palpitation, dizziness or syncope.       In ED Vs /70, HR 20, Temp 98.5, O2 sat 99%  RA   lab notable for wbc 9.8, h/h 14.1/43.7, k 4.5, bun/cr 25/0.9, Mg 2.1, BNP 5034   in the ED, patient was given a dose of duoneb x1 which made patient go into afib, RVR, HR 130s, Rate was treated with IV and PO cardizem   patient given IL of IVF, DuoNeb x1, Cardizem 10mg IV x1 , Cardizem 30 po x1    74 y/o female with pMHx of Paroxysmal afib/aflutter s/p DCCV ( refused AC due to side effect, currently on asa ) , CHF, HFrEF ( LVEF 39% ) , HTN presents to the ED with worsening cough and SHETH for the past week. Patient report feeling weak and been coughing for a week now associated with mild sputum production and SHETH after walking a few steps. Patient reports chest tightness with cough but denies chest pain on exertion; admits to eating alot of  salty fish and recently missed her stress test appointment due to  not feeling well and being admitted to the hospital for pneumonia. Patient admits to not taking Eliquis due to side effect of GI bleed, denies any bleeding or hematuria. Exercise tolerance decreased to a few steps due to SHETH and sleeping on 2-3 pillows for orthopnea, denies PND or leg swelling but stated has Lasix which she takes prn when her leg swells up, last dose of Lasix was 2 days ago. Patient denies fever, chills, abdominal pain, nausea, vomiting, diarrhea, slurred speech, recent travel, palpitation, dizziness or syncope.       In ED Vs /70, HR 20, Temp 98.5, O2 sat 99%  RA   lab notable for wbc 9.8, h/h 14.1/43.7, k 4.5, bun/cr 25/0.9, Mg 2.1, BNP 5034   in the ED, patient was given a dose of duoneb x1 which made patient go into afib, RVR, HR 130s, Rate was treated with IV and PO cardizem   patient given IL of IVF, DuoNeb x1, Cardizem 10mg IV x1 , Cardizem 30 po x1

## 2023-11-20 NOTE — ED ADULT NURSE REASSESSMENT NOTE - NS ED NURSE REASSESS COMMENT FT1
Pt upgraded from Main ED to Critical care due to 12 lead EKG showing a fib with RVR -160's. LAC 18G noted. Pt complaining of cough for several months with chest pain due to the coughing. Pt A/Ox4 has no other complaints.

## 2023-11-20 NOTE — ED PROVIDER NOTE - OBJECTIVE STATEMENT
73y F hx of Afib on Metoprolol and Eliquis follows with Dr. Head presents for evaluation of dyspnea on exertion and cough for the past few months. Denies chest pain, nausea, vomiting, abdominal pain. No sick contacts, no productive sputum. Patient was given one albuterol treatment for presumed bronchitis, when she was found to be in rapid afib . Patient admitted to taking only 25 of her metoprolol and stated she hasn't taken eliquis in a long time because she was scared of the side effects. Patient upgraded to critical care area of ER, /60s.

## 2023-11-20 NOTE — ED PROVIDER NOTE - CLINICAL SUMMARY MEDICAL DECISION MAKING FREE TEXT BOX
73-year-old female presented today with coughing and shortness of breath.  Patient is hemodynamically stable upon evaluation.  Patient given nebulizer treatment for suspected reactive airway disease.  Patient noted to be in A-fib with rapid ventricular response and required diltiazem.  Patient was admitted to cardiac telemetry monitoring for further evaluation and care.

## 2023-11-21 LAB
A1C WITH ESTIMATED AVERAGE GLUCOSE RESULT: 6.7 % — HIGH (ref 4–5.6)
A1C WITH ESTIMATED AVERAGE GLUCOSE RESULT: 6.7 % — HIGH (ref 4–5.6)
ALBUMIN SERPL ELPH-MCNC: 3.9 G/DL — SIGNIFICANT CHANGE UP (ref 3.5–5.2)
ALBUMIN SERPL ELPH-MCNC: 3.9 G/DL — SIGNIFICANT CHANGE UP (ref 3.5–5.2)
ALP SERPL-CCNC: 98 U/L — SIGNIFICANT CHANGE UP (ref 30–115)
ALP SERPL-CCNC: 98 U/L — SIGNIFICANT CHANGE UP (ref 30–115)
ALT FLD-CCNC: 44 U/L — HIGH (ref 0–41)
ALT FLD-CCNC: 44 U/L — HIGH (ref 0–41)
ANION GAP SERPL CALC-SCNC: 13 MMOL/L — SIGNIFICANT CHANGE UP (ref 7–14)
ANION GAP SERPL CALC-SCNC: 13 MMOL/L — SIGNIFICANT CHANGE UP (ref 7–14)
APTT BLD: 32.2 SEC — SIGNIFICANT CHANGE UP (ref 27–39.2)
APTT BLD: 32.2 SEC — SIGNIFICANT CHANGE UP (ref 27–39.2)
AST SERPL-CCNC: 56 U/L — HIGH (ref 0–41)
AST SERPL-CCNC: 56 U/L — HIGH (ref 0–41)
BILIRUB DIRECT SERPL-MCNC: 0.2 MG/DL — SIGNIFICANT CHANGE UP (ref 0–0.3)
BILIRUB DIRECT SERPL-MCNC: 0.2 MG/DL — SIGNIFICANT CHANGE UP (ref 0–0.3)
BILIRUB INDIRECT FLD-MCNC: 0.5 MG/DL — SIGNIFICANT CHANGE UP (ref 0.2–1.2)
BILIRUB INDIRECT FLD-MCNC: 0.5 MG/DL — SIGNIFICANT CHANGE UP (ref 0.2–1.2)
BILIRUB SERPL-MCNC: 0.7 MG/DL — SIGNIFICANT CHANGE UP (ref 0.2–1.2)
BILIRUB SERPL-MCNC: 0.7 MG/DL — SIGNIFICANT CHANGE UP (ref 0.2–1.2)
BUN SERPL-MCNC: 22 MG/DL — HIGH (ref 10–20)
BUN SERPL-MCNC: 22 MG/DL — HIGH (ref 10–20)
CALCIUM SERPL-MCNC: 9 MG/DL — SIGNIFICANT CHANGE UP (ref 8.4–10.4)
CALCIUM SERPL-MCNC: 9 MG/DL — SIGNIFICANT CHANGE UP (ref 8.4–10.4)
CHLORIDE SERPL-SCNC: 97 MMOL/L — LOW (ref 98–110)
CHLORIDE SERPL-SCNC: 97 MMOL/L — LOW (ref 98–110)
CHOLEST SERPL-MCNC: 170 MG/DL — SIGNIFICANT CHANGE UP
CHOLEST SERPL-MCNC: 170 MG/DL — SIGNIFICANT CHANGE UP
CO2 SERPL-SCNC: 27 MMOL/L — SIGNIFICANT CHANGE UP (ref 17–32)
CO2 SERPL-SCNC: 27 MMOL/L — SIGNIFICANT CHANGE UP (ref 17–32)
CREAT SERPL-MCNC: 0.8 MG/DL — SIGNIFICANT CHANGE UP (ref 0.7–1.5)
CREAT SERPL-MCNC: 0.8 MG/DL — SIGNIFICANT CHANGE UP (ref 0.7–1.5)
CRP SERPL-MCNC: 21.1 MG/L — HIGH
CRP SERPL-MCNC: 21.1 MG/L — HIGH
EGFR: 78 ML/MIN/1.73M2 — SIGNIFICANT CHANGE UP
EGFR: 78 ML/MIN/1.73M2 — SIGNIFICANT CHANGE UP
ERYTHROCYTE [SEDIMENTATION RATE] IN BLOOD: 10 MM/HR — SIGNIFICANT CHANGE UP (ref 0–20)
ERYTHROCYTE [SEDIMENTATION RATE] IN BLOOD: 10 MM/HR — SIGNIFICANT CHANGE UP (ref 0–20)
ESTIMATED AVERAGE GLUCOSE: 146 MG/DL — HIGH (ref 68–114)
ESTIMATED AVERAGE GLUCOSE: 146 MG/DL — HIGH (ref 68–114)
GLUCOSE SERPL-MCNC: 110 MG/DL — HIGH (ref 70–99)
GLUCOSE SERPL-MCNC: 110 MG/DL — HIGH (ref 70–99)
HCT VFR BLD CALC: 42.9 % — SIGNIFICANT CHANGE UP (ref 37–47)
HCT VFR BLD CALC: 42.9 % — SIGNIFICANT CHANGE UP (ref 37–47)
HCV AB S/CO SERPL IA: 0.04 COI — SIGNIFICANT CHANGE UP
HCV AB S/CO SERPL IA: 0.04 COI — SIGNIFICANT CHANGE UP
HCV AB SERPL-IMP: SIGNIFICANT CHANGE UP
HCV AB SERPL-IMP: SIGNIFICANT CHANGE UP
HDLC SERPL-MCNC: 49 MG/DL — LOW
HDLC SERPL-MCNC: 49 MG/DL — LOW
HGB BLD-MCNC: 14 G/DL — SIGNIFICANT CHANGE UP (ref 12–16)
HGB BLD-MCNC: 14 G/DL — SIGNIFICANT CHANGE UP (ref 12–16)
INR BLD: 1.51 RATIO — HIGH (ref 0.65–1.3)
INR BLD: 1.51 RATIO — HIGH (ref 0.65–1.3)
LIPID PNL WITH DIRECT LDL SERPL: 106 MG/DL — HIGH
LIPID PNL WITH DIRECT LDL SERPL: 106 MG/DL — HIGH
MAGNESIUM SERPL-MCNC: 1.9 MG/DL — SIGNIFICANT CHANGE UP (ref 1.8–2.4)
MAGNESIUM SERPL-MCNC: 1.9 MG/DL — SIGNIFICANT CHANGE UP (ref 1.8–2.4)
MCHC RBC-ENTMCNC: 29.9 PG — SIGNIFICANT CHANGE UP (ref 27–31)
MCHC RBC-ENTMCNC: 29.9 PG — SIGNIFICANT CHANGE UP (ref 27–31)
MCHC RBC-ENTMCNC: 32.6 G/DL — SIGNIFICANT CHANGE UP (ref 32–37)
MCHC RBC-ENTMCNC: 32.6 G/DL — SIGNIFICANT CHANGE UP (ref 32–37)
MCV RBC AUTO: 91.5 FL — SIGNIFICANT CHANGE UP (ref 81–99)
MCV RBC AUTO: 91.5 FL — SIGNIFICANT CHANGE UP (ref 81–99)
NON HDL CHOLESTEROL: 121 MG/DL — SIGNIFICANT CHANGE UP
NON HDL CHOLESTEROL: 121 MG/DL — SIGNIFICANT CHANGE UP
NRBC # BLD: 0 /100 WBCS — SIGNIFICANT CHANGE UP (ref 0–0)
NRBC # BLD: 0 /100 WBCS — SIGNIFICANT CHANGE UP (ref 0–0)
PLATELET # BLD AUTO: 172 K/UL — SIGNIFICANT CHANGE UP (ref 130–400)
PLATELET # BLD AUTO: 172 K/UL — SIGNIFICANT CHANGE UP (ref 130–400)
PMV BLD: 10.9 FL — HIGH (ref 7.4–10.4)
PMV BLD: 10.9 FL — HIGH (ref 7.4–10.4)
POTASSIUM SERPL-MCNC: 3.5 MMOL/L — SIGNIFICANT CHANGE UP (ref 3.5–5)
POTASSIUM SERPL-MCNC: 3.5 MMOL/L — SIGNIFICANT CHANGE UP (ref 3.5–5)
POTASSIUM SERPL-SCNC: 3.5 MMOL/L — SIGNIFICANT CHANGE UP (ref 3.5–5)
POTASSIUM SERPL-SCNC: 3.5 MMOL/L — SIGNIFICANT CHANGE UP (ref 3.5–5)
PROT SERPL-MCNC: 6.2 G/DL — SIGNIFICANT CHANGE UP (ref 6–8)
PROT SERPL-MCNC: 6.2 G/DL — SIGNIFICANT CHANGE UP (ref 6–8)
PROTHROM AB SERPL-ACNC: 17.3 SEC — HIGH (ref 9.95–12.87)
PROTHROM AB SERPL-ACNC: 17.3 SEC — HIGH (ref 9.95–12.87)
RAPID RVP RESULT: SIGNIFICANT CHANGE UP
RAPID RVP RESULT: SIGNIFICANT CHANGE UP
RBC # BLD: 4.69 M/UL — SIGNIFICANT CHANGE UP (ref 4.2–5.4)
RBC # BLD: 4.69 M/UL — SIGNIFICANT CHANGE UP (ref 4.2–5.4)
RBC # FLD: 13.8 % — SIGNIFICANT CHANGE UP (ref 11.5–14.5)
RBC # FLD: 13.8 % — SIGNIFICANT CHANGE UP (ref 11.5–14.5)
SARS-COV-2 RNA SPEC QL NAA+PROBE: SIGNIFICANT CHANGE UP
SARS-COV-2 RNA SPEC QL NAA+PROBE: SIGNIFICANT CHANGE UP
SODIUM SERPL-SCNC: 137 MMOL/L — SIGNIFICANT CHANGE UP (ref 135–146)
SODIUM SERPL-SCNC: 137 MMOL/L — SIGNIFICANT CHANGE UP (ref 135–146)
TRIGL SERPL-MCNC: 73 MG/DL — SIGNIFICANT CHANGE UP
TRIGL SERPL-MCNC: 73 MG/DL — SIGNIFICANT CHANGE UP
TROPONIN T SERPL-MCNC: <0.01 NG/ML — SIGNIFICANT CHANGE UP
TROPONIN T SERPL-MCNC: <0.01 NG/ML — SIGNIFICANT CHANGE UP
WBC # BLD: 5.83 K/UL — SIGNIFICANT CHANGE UP (ref 4.8–10.8)
WBC # BLD: 5.83 K/UL — SIGNIFICANT CHANGE UP (ref 4.8–10.8)
WBC # FLD AUTO: 5.83 K/UL — SIGNIFICANT CHANGE UP (ref 4.8–10.8)
WBC # FLD AUTO: 5.83 K/UL — SIGNIFICANT CHANGE UP (ref 4.8–10.8)

## 2023-11-21 PROCEDURE — 99233 SBSQ HOSP IP/OBS HIGH 50: CPT

## 2023-11-21 RX ORDER — POTASSIUM CHLORIDE 20 MEQ
20 PACKET (EA) ORAL
Refills: 0 | Status: COMPLETED | OUTPATIENT
Start: 2023-11-21 | End: 2023-11-21

## 2023-11-21 RX ORDER — MAGNESIUM SULFATE 500 MG/ML
2 VIAL (ML) INJECTION ONCE
Refills: 0 | Status: COMPLETED | OUTPATIENT
Start: 2023-11-21 | End: 2023-11-21

## 2023-11-21 RX ADMIN — Medication 20 MILLIEQUIVALENT(S): at 10:13

## 2023-11-21 RX ADMIN — Medication 50 MILLIGRAM(S): at 12:48

## 2023-11-21 RX ADMIN — Medication 50 MILLIGRAM(S): at 05:13

## 2023-11-21 RX ADMIN — Medication 100 MILLIGRAM(S): at 07:41

## 2023-11-21 RX ADMIN — Medication 100 MILLIGRAM(S): at 18:01

## 2023-11-21 RX ADMIN — Medication 20 MILLIEQUIVALENT(S): at 12:48

## 2023-11-21 RX ADMIN — Medication 50 MILLIGRAM(S): at 23:04

## 2023-11-21 RX ADMIN — Medication 20 MILLIEQUIVALENT(S): at 15:22

## 2023-11-21 RX ADMIN — Medication 50 MILLIGRAM(S): at 18:01

## 2023-11-21 RX ADMIN — Medication 25 GRAM(S): at 10:13

## 2023-11-21 RX ADMIN — APIXABAN 5 MILLIGRAM(S): 2.5 TABLET, FILM COATED ORAL at 05:13

## 2023-11-21 RX ADMIN — APIXABAN 5 MILLIGRAM(S): 2.5 TABLET, FILM COATED ORAL at 18:01

## 2023-11-21 NOTE — PROGRESS NOTE ADULT - ASSESSMENT
Assessment	  74 y/o female with pMHx of Paroxysmal afib/aflutter s/p DCCV ( refused AC, currently on asa ) , CHF, HFrEF ( LVEF 39% )  with worsening SHETH and cough, now admitted to [facility] tele for acute on chronic CHF exacerbation.     # acute on chronic CHF exacerbation   -admit to cards tele   -trop <.01, <.01   -continue telemetry monitoring  -vitals as per floor protocol  -EKG revealing afib, RVR   -CXR no focal consolidation  -elevated BNP   -lasix 40mg x1 in the ed   -monitor electrolyte ,maintain K >4 , mg >2 and supple as needed  -daily weight  -strict I&O  -TTE 11/20- EF 20-25%, mild to mod mv regurg , mild pulm htn,    #URI  - actively coughing  - RVP/ FLU pend    #Afib/Aflutter s/p IV Cardizem and PO Cardizem - Rate controlled now   - CHADSCVASC 4  -Benefit and risk of AC, discussed with patient and agrees now to AC   - will start pt on Eliquis 5mg q12 for A/C  - Continue  metoprolol to 50 BID    #HTN   -Continue with Metroprolol 50 mg q12 for now     FULL CODE   Cardiac diet   DVT/GI PPX: Eliquis and Protonix    Disp : Pending IV diuresis     [phone]     Assessment	  72 y/o female with pMHx of Paroxysmal afib/aflutter s/p DCCV ( refused AC, currently on asa ) , CHF, HFrEF ( LVEF 39% )  with worsening SHETH and cough, now admitted to cards tele for acute on chronic CHF exacerbation.     # acute on chronic CHF exacerbation   -admit to cards tele   -trop <.01, <.01   -continue telemetry monitoring  -vitals as per floor protocol  -EKG revealing afib, RVR   -CXR no focal consolidation  -elevated BNP   -lasix 40mg x1 in the ed   -monitor electrolyte ,maintain K >4 , mg >2 and supple as needed  -daily weight  -strict I&O  -TTE 11/20- EF 20-25%, mild to mod mv regurg , mild pulm htn,    #URI  - actively coughing  - RVP/ FLU Neg    #Afib/Aflutter s/p IV Cardizem and PO Cardizem - Rate controlled now   - CHADSCVASC 4  -Benefit and risk of AC, discussed with patient and agrees now to AC   - will start pt on Eliquis 5mg q12 for A/C  - Continue  metoprolol to 50 BID    #HTN   -Continue with Metroprolol 50 mg q12 for now     FULL CODE   Cardiac diet   DVT/GI PPX: Eliquis and Protonix    Disp : DC planning    [phone]

## 2023-11-21 NOTE — PROGRESS NOTE ADULT - SUBJECTIVE AND OBJECTIVE BOX
Chief complaint: Patient is a 73y old  Female who presents with a chief complaint of cough and shortness of breaht (20 Nov 2023 03:48)    Interval history: Patient seen and examined at bedside. Patient reports feeling weak. Denies any cp, palpitations.     Review of systems: A complete 10-point review of systems was obtained and is negative except as stated in the interval history.    Vitals:  T(F): 98.3, Max: 100.1 (11-20 @ 16:53)  HR: 91 (78 - 125)  BP: 106/70 (95/72 - 143/65)  RR: 18 (18 - 19)  SpO2: 100% (92% - 100%)    Ins & outs:     11-19 @ 07:01  -  11-20 @ 07:00  --------------------------------------------------------  IN: 120 mL / OUT: 2980 mL / NET: -2860 mL    11-20 @ 07:01  -  11-21 @ 07:00  --------------------------------------------------------  IN: 911.5 mL / OUT: 650 mL / NET: 261.5 mL    11-21 @ 07:01  -  11-21 @ 10:34  --------------------------------------------------------  IN: 210 mL / OUT: 0 mL / NET: 210 mL      Weight trend:  Weight (kg): 73.5 (11-20)    Physical exam:  General: No apparent distress  HEENT: Anicteric sclera. Moist mucous membranes. JVD-   Cardiac: Regular rate and rhythm. No murmurs, rubs, or gallops.   Vascular: Symmetric radial pulses. Dorsalis pedis pulses palpable.   Respiratory: Normal effort. Bibasilar crackles. Clear to ascultation.   Abdomen: Soft, nontender. Audible bowel sounds.   Extremities: Warm with no edema. No cyanosis or clubbing.   Skin: Warm and dry. No rash.   Neurologic: Grossly normal motor function.   Psychiatric: Oriented to person, place, and time.     Data reviewed:  - Telemetry: Afib/Flutter 109, NSVT 2 episodes   - ECG < from: 12 Lead ECG (11.20.23 @ 07:17) >    Diagnosis Line Atrial fibrillation with rapid ventricular response with premature ventricular  or aberrantly conducted complexes  Nonspecific T wave abnormality  Abnormal ECG    < end of copied text >    - TTE < from: TTE Echo Complete w/ Contrast w/ Doppler (11.20.23 @ 10:46) >      Summary:   1. Severely decreased global left ventricular systolic function. Left   ventricular ejection fraction, by visual estimation, is 20 to 25%. The   left ventricular diastolic function could not be assessed in this study  due to atrial arrhythmia.   2. Normal sized right ventricle with mildly reduced systolic function.   3. Moderate to severe left atrial enlargement.   4. Moderately enlarged right atrium.   5. Mild to moderate mitral valve regurgitation.   6. Sclerotic aortic valve with normal opening.   7. Mild pulmonary hypertension (PASP = 46mmHg).   8. There is no evidence of pericardial effusion.   9. Compared to prior study, left ventricular systolic function has   worsened.    < end of copied text >    - Chest x-ray < from: Xray Chest 1 View- PORTABLE-Urgent (Xray Chest 1 View- PORTABLE-Urgent .) (11.20.23 @ 00:04) >  FINDINGS/  IMPRESSION:      No focal consolidation, pneumothorax or pleural effusion.    Stable cardiomediastinal silhouette.    Unchanged osseous structures.    < end of copied text >     No recent Stress test, CCTA, Cardiac catheterization or Cardiac MRI.    - Labs:                        14.0   5.83  )-----------( 172      ( 21 Nov 2023 05:26 )             42.9     11-21    137  |  97<L>  |  22<H>  ----------------------------<  110<H>  3.5   |  27  |  0.8    Ca    9.0      21 Nov 2023 05:26  Mg     1.9     11-21    TPro  6.2  /  Alb  3.9  /  TBili  0.7  /  DBili  0.2  /  AST  56<H>  /  ALT  44<H>  /  AlkPhos  98  11-21    PT/INR - ( 21 Nov 2023 05:26 )   PT: 17.30 sec;   INR: 1.51 ratio         PTT - ( 21 Nov 2023 05:26 )  PTT:32.2 sec  Troponin T, Serum: <0.01 ng/mL (11-21-23 @ 05:26)  Troponin T, Serum: <0.01 ng/mL (11-20-23 @ 01:46)        Triglycerides, Serum: 73 mg/dL (11-21-23 @ 05:26)  LDL Cholesterol Calculated: 106 mg/dL (11-21-23 @ 05:26)      Urinalysis Basic - ( 21 Nov 2023 05:26 )    Color: x / Appearance: x / SG: x / pH: x  Gluc: 110 mg/dL / Ketone: x  / Bili: x / Urobili: x   Blood: x / Protein: x / Nitrite: x   Leuk Esterase: x / RBC: x / WBC x   Sq Epi: x / Non Sq Epi: x / Bacteria: x        Medications:  apixaban 5 milliGRAM(s) Oral every 12 hours  metoprolol tartrate 50 milliGRAM(s) Oral every 6 hours  potassium chloride    Tablet ER 20 milliEquivalent(s) Oral every 2 hours    Drips:    PRN:     Allergies    No Known Allergies    Intolerances

## 2023-11-22 LAB
ALBUMIN SERPL ELPH-MCNC: 3.3 G/DL — LOW (ref 3.5–5.2)
ALBUMIN SERPL ELPH-MCNC: 3.3 G/DL — LOW (ref 3.5–5.2)
ALP SERPL-CCNC: 87 U/L — SIGNIFICANT CHANGE UP (ref 30–115)
ALP SERPL-CCNC: 87 U/L — SIGNIFICANT CHANGE UP (ref 30–115)
ALT FLD-CCNC: 41 U/L — SIGNIFICANT CHANGE UP (ref 0–41)
ALT FLD-CCNC: 41 U/L — SIGNIFICANT CHANGE UP (ref 0–41)
ANION GAP SERPL CALC-SCNC: 9 MMOL/L — SIGNIFICANT CHANGE UP (ref 7–14)
ANION GAP SERPL CALC-SCNC: 9 MMOL/L — SIGNIFICANT CHANGE UP (ref 7–14)
AST SERPL-CCNC: 55 U/L — HIGH (ref 0–41)
AST SERPL-CCNC: 55 U/L — HIGH (ref 0–41)
BILIRUB DIRECT SERPL-MCNC: 0.2 MG/DL — SIGNIFICANT CHANGE UP (ref 0–0.3)
BILIRUB DIRECT SERPL-MCNC: 0.2 MG/DL — SIGNIFICANT CHANGE UP (ref 0–0.3)
BILIRUB INDIRECT FLD-MCNC: 0.2 MG/DL — SIGNIFICANT CHANGE UP (ref 0.2–1.2)
BILIRUB INDIRECT FLD-MCNC: 0.2 MG/DL — SIGNIFICANT CHANGE UP (ref 0.2–1.2)
BILIRUB SERPL-MCNC: 0.4 MG/DL — SIGNIFICANT CHANGE UP (ref 0.2–1.2)
BILIRUB SERPL-MCNC: 0.4 MG/DL — SIGNIFICANT CHANGE UP (ref 0.2–1.2)
BUN SERPL-MCNC: 22 MG/DL — HIGH (ref 10–20)
BUN SERPL-MCNC: 22 MG/DL — HIGH (ref 10–20)
CALCIUM SERPL-MCNC: 8.5 MG/DL — SIGNIFICANT CHANGE UP (ref 8.4–10.5)
CALCIUM SERPL-MCNC: 8.5 MG/DL — SIGNIFICANT CHANGE UP (ref 8.4–10.5)
CHLORIDE SERPL-SCNC: 99 MMOL/L — SIGNIFICANT CHANGE UP (ref 98–110)
CHLORIDE SERPL-SCNC: 99 MMOL/L — SIGNIFICANT CHANGE UP (ref 98–110)
CO2 SERPL-SCNC: 29 MMOL/L — SIGNIFICANT CHANGE UP (ref 17–32)
CO2 SERPL-SCNC: 29 MMOL/L — SIGNIFICANT CHANGE UP (ref 17–32)
CREAT SERPL-MCNC: 0.8 MG/DL — SIGNIFICANT CHANGE UP (ref 0.7–1.5)
CREAT SERPL-MCNC: 0.8 MG/DL — SIGNIFICANT CHANGE UP (ref 0.7–1.5)
EGFR: 78 ML/MIN/1.73M2 — SIGNIFICANT CHANGE UP
EGFR: 78 ML/MIN/1.73M2 — SIGNIFICANT CHANGE UP
GLUCOSE SERPL-MCNC: 104 MG/DL — HIGH (ref 70–99)
GLUCOSE SERPL-MCNC: 104 MG/DL — HIGH (ref 70–99)
HCT VFR BLD CALC: 42.1 % — SIGNIFICANT CHANGE UP (ref 37–47)
HCT VFR BLD CALC: 42.1 % — SIGNIFICANT CHANGE UP (ref 37–47)
HGB BLD-MCNC: 13.6 G/DL — SIGNIFICANT CHANGE UP (ref 12–16)
HGB BLD-MCNC: 13.6 G/DL — SIGNIFICANT CHANGE UP (ref 12–16)
MAGNESIUM SERPL-MCNC: 2.1 MG/DL — SIGNIFICANT CHANGE UP (ref 1.8–2.4)
MAGNESIUM SERPL-MCNC: 2.1 MG/DL — SIGNIFICANT CHANGE UP (ref 1.8–2.4)
MCHC RBC-ENTMCNC: 29.6 PG — SIGNIFICANT CHANGE UP (ref 27–31)
MCHC RBC-ENTMCNC: 29.6 PG — SIGNIFICANT CHANGE UP (ref 27–31)
MCHC RBC-ENTMCNC: 32.3 G/DL — SIGNIFICANT CHANGE UP (ref 32–37)
MCHC RBC-ENTMCNC: 32.3 G/DL — SIGNIFICANT CHANGE UP (ref 32–37)
MCV RBC AUTO: 91.5 FL — SIGNIFICANT CHANGE UP (ref 81–99)
MCV RBC AUTO: 91.5 FL — SIGNIFICANT CHANGE UP (ref 81–99)
NRBC # BLD: 0 /100 WBCS — SIGNIFICANT CHANGE UP (ref 0–0)
NRBC # BLD: 0 /100 WBCS — SIGNIFICANT CHANGE UP (ref 0–0)
PLATELET # BLD AUTO: 178 K/UL — SIGNIFICANT CHANGE UP (ref 130–400)
PLATELET # BLD AUTO: 178 K/UL — SIGNIFICANT CHANGE UP (ref 130–400)
PMV BLD: 11.3 FL — HIGH (ref 7.4–10.4)
PMV BLD: 11.3 FL — HIGH (ref 7.4–10.4)
POTASSIUM SERPL-MCNC: 4.4 MMOL/L — SIGNIFICANT CHANGE UP (ref 3.5–5)
POTASSIUM SERPL-MCNC: 4.4 MMOL/L — SIGNIFICANT CHANGE UP (ref 3.5–5)
POTASSIUM SERPL-SCNC: 4.4 MMOL/L — SIGNIFICANT CHANGE UP (ref 3.5–5)
POTASSIUM SERPL-SCNC: 4.4 MMOL/L — SIGNIFICANT CHANGE UP (ref 3.5–5)
PROT SERPL-MCNC: 5.4 G/DL — LOW (ref 6–8)
PROT SERPL-MCNC: 5.4 G/DL — LOW (ref 6–8)
RBC # BLD: 4.6 M/UL — SIGNIFICANT CHANGE UP (ref 4.2–5.4)
RBC # BLD: 4.6 M/UL — SIGNIFICANT CHANGE UP (ref 4.2–5.4)
RBC # FLD: 13.7 % — SIGNIFICANT CHANGE UP (ref 11.5–14.5)
RBC # FLD: 13.7 % — SIGNIFICANT CHANGE UP (ref 11.5–14.5)
SODIUM SERPL-SCNC: 137 MMOL/L — SIGNIFICANT CHANGE UP (ref 135–146)
SODIUM SERPL-SCNC: 137 MMOL/L — SIGNIFICANT CHANGE UP (ref 135–146)
WBC # BLD: 5.13 K/UL — SIGNIFICANT CHANGE UP (ref 4.8–10.8)
WBC # BLD: 5.13 K/UL — SIGNIFICANT CHANGE UP (ref 4.8–10.8)
WBC # FLD AUTO: 5.13 K/UL — SIGNIFICANT CHANGE UP (ref 4.8–10.8)
WBC # FLD AUTO: 5.13 K/UL — SIGNIFICANT CHANGE UP (ref 4.8–10.8)

## 2023-11-22 PROCEDURE — 71250 CT THORAX DX C-: CPT | Mod: 26

## 2023-11-22 PROCEDURE — 99233 SBSQ HOSP IP/OBS HIGH 50: CPT

## 2023-11-22 RX ORDER — IPRATROPIUM BROMIDE 0.2 MG/ML
1 SOLUTION, NON-ORAL INHALATION EVERY 6 HOURS
Refills: 0 | Status: DISCONTINUED | OUTPATIENT
Start: 2023-11-22 | End: 2023-11-24

## 2023-11-22 RX ADMIN — Medication 50 MILLIGRAM(S): at 11:59

## 2023-11-22 RX ADMIN — Medication 50 MILLIGRAM(S): at 23:13

## 2023-11-22 RX ADMIN — Medication 50 MILLIGRAM(S): at 18:02

## 2023-11-22 RX ADMIN — APIXABAN 5 MILLIGRAM(S): 2.5 TABLET, FILM COATED ORAL at 06:21

## 2023-11-22 RX ADMIN — Medication 100 MILLIGRAM(S): at 23:13

## 2023-11-22 RX ADMIN — Medication 50 MILLIGRAM(S): at 06:21

## 2023-11-22 RX ADMIN — APIXABAN 5 MILLIGRAM(S): 2.5 TABLET, FILM COATED ORAL at 18:02

## 2023-11-22 NOTE — DIETITIAN INITIAL EVALUATION ADULT - PERTINENT MEDS FT
MEDICATIONS  (STANDING):  apixaban 5 milliGRAM(s) Oral every 12 hours  metoprolol tartrate 50 milliGRAM(s) Oral every 6 hours    MEDICATIONS  (PRN):  acetaminophen     Tablet .. 650 milliGRAM(s) Oral every 6 hours PRN Mild Pain (1 - 3), Moderate Pain (4 - 6)  benzonatate 100 milliGRAM(s) Oral every 8 hours PRN Cough

## 2023-11-22 NOTE — PROGRESS NOTE ADULT - SUBJECTIVE AND OBJECTIVE BOX
Chief complaint: Patient is a 73y old  Female who presents with a chief complaint of cough and shortness of breath.    Interval history:   Patient seen and examined at bedside. Patient reports feeling weak. Denies any cp, palpitations.     Review of systems: A complete 10-point review of systems was obtained and is negative except as stated in the interval history.    Vitals:    T(C): 36.8 (22 Nov 2023 07:38), Max: 36.8 (21 Nov 2023 16:18)  T(F): 98.2 (22 Nov 2023 07:38), Max: 98.3 (21 Nov 2023 16:18)  HR: 93 (22 Nov 2023 07:38) (82 - 103)  BP: 91/62 (22 Nov 2023 07:38) (91/52 - 125/61)  BP(mean): 72 (22 Nov 2023 07:38) (67 - 100)    RR: 18 (22 Nov 2023 07:38) (17 - 19)  SpO2: 97% (22 Nov 2023 07:38) (95% - 97%)    O2 Parameters below as of 22 Nov 2023 06:15  Patient On (Oxygen Delivery Method): room air      Ins & outs:     I&O's Summary    21 Nov 2023 07:01  -  22 Nov 2023 07:00  --------------------------------------------------------  IN: 440 mL / OUT: 200 mL / NET: 240 mL    22 Nov 2023 07:01  -  22 Nov 2023 10:01  --------------------------------------------------------  IN: 200 mL / OUT: 0 mL / NET: 200 mL        Weight trend:  Weight (kg): 73.5 (11-20)    Physical exam:  General: No apparent distress  HEENT: Anicteric sclera. Moist mucous membranes. JVD-   Cardiac: Regular rate and rhythm. No murmurs, rubs, or gallops.   Vascular: Symmetric radial pulses. Dorsalis pedis pulses palpable.   Respiratory: Normal effort.  Clear to ascultation.   Abdomen: Soft, nontender. Audible bowel sounds.   Extremities: Warm with no edema. No cyanosis or clubbing.   Skin: Warm and dry. No rash.   Neurologic: Grossly normal motor function.   Psychiatric: Oriented to person, place, and time.     Data reviewed:  - Telemetry: Afib/Flutter 109, NSVT 2 episodes   - ECG < from: 12 Lead ECG (11.20.23 @ 07:17) >    Diagnosis Line Atrial fibrillation with rapid ventricular response with premature ventricular  or aberrantly conducted complexes  Nonspecific T wave abnormality  Abnormal ECG    < end of copied text >    - TTE < from: TTE Echo Complete w/ Contrast w/ Doppler (11.20.23 @ 10:46) >      Summary:   1. Severely decreased global left ventricular systolic function. Left   ventricular ejection fraction, by visual estimation, is 20 to 25%. The   left ventricular diastolic function could not be assessed in this study  due to atrial arrhythmia.   2. Normal sized right ventricle with mildly reduced systolic function.   3. Moderate to severe left atrial enlargement.   4. Moderately enlarged right atrium.   5. Mild to moderate mitral valve regurgitation.   6. Sclerotic aortic valve with normal opening.   7. Mild pulmonary hypertension (PASP = 46mmHg).   8. There is no evidence of pericardial effusion.   9. Compared to prior study, left ventricular systolic function has   worsened.    < end of copied text >    - Chest x-ray < from: Xray Chest 1 View- PORTABLE-Urgent (Xray Chest 1 View- PORTABLE-Urgent .) (11.20.23 @ 00:04) >  FINDINGS/  IMPRESSION:      No focal consolidation, pneumothorax or pleural effusion.    Stable cardiomediastinal silhouette.    Unchanged osseous structures.    < end of copied text >     No recent Stress test, CCTA, Cardiac catheterization or Cardiac MRI.      - Labs:                        13.6   5.13  )-----------( 178      ( 22 Nov 2023 05:38 )             42.1     11-22    137  |  99  |  22<H>  ----------------------------<  104<H>  4.4   |  29  |  0.8    Ca    8.5      22 Nov 2023 05:38  Mg     2.1     11-22    TPro  6.2  /  Alb  3.9  /  TBili  0.7  /  DBili  0.2  /  AST  56<H>  /  ALT  44<H>  /  AlkPhos  98  11-21    LIVER FUNCTIONS - ( 21 Nov 2023 05:26 )  Alb: 3.9 g/dL / Pro: 6.2 g/dL / ALK PHOS: 98 U/L / ALT: 44 U/L / AST: 56 U/L / GGT: x           PT/INR - ( 21 Nov 2023 05:26 )   PT: 17.30 sec;   INR: 1.51 ratio         PTT - ( 21 Nov 2023 05:26 )  PTT:32.2 sec    CARDIAC MARKERS ( 21 Nov 2023 05:26 )  x     / <0.01 ng/mL / x     / x     / x            Lactate Trend    Urinalysis Basic - ( 22 Nov 2023 05:38 )    Color: x / Appearance: x / SG: x / pH: x  Gluc: 104 mg/dL / Ketone: x  / Bili: x / Urobili: x   Blood: x / Protein: x / Nitrite: x   Leuk Esterase: x / RBC: x / WBC x   Sq Epi: x / Non Sq Epi: x / Bacteria: x                          Medications:  apixaban 5 milliGRAM(s) Oral every 12 hours  metoprolol tartrate 50 milliGRAM(s) Oral every 6 hours  potassium chloride    Tablet ER 20 milliEquivalent(s) Oral every 2 hours    Drips:    PRN:     Allergies    No Known Allergies    Intolerances

## 2023-11-22 NOTE — DIETITIAN INITIAL EVALUATION ADULT - OTHER INFO
INTERVENTIONAL RADIOLOGY PROCEDURE NOTE    Date: 5/9/2023    Procedure:   Procedure Summary     Date: 05/09/23 Room / Location: [place] [address] CAT Scan    Anesthesia Start:  Anesthesia Stop:     Procedure: IR BIOPSY LIVER MASS Diagnosis:       Malignant neoplasm of sigmoid colon ([address] )      (Right hepatic lesion, history of rectal cancer)    Scheduled Providers: Jeannette Verduzco MD Responsible Provider:     Anesthesia Type: Not recorded ASA Status: Not recorded          Preoperative diagnosis:   1  Malignant neoplasm of sigmoid colon (HCC)         Postoperative diagnosis: Same  Surgeon: Jeannette Verduzco MD     Assistant: None  No qualified resident was available  Blood loss: Minimal    Specimens: 3, 18 G cores     Findings: Difficult biopsy  Unable to see lesion with US or non-contrast CT  IV contrast given and re-scanned which showed vague area of decreased attenuation in the expected location  The area was prepped and draped and needle advanced to edge of liver using landmarks  Repeat IV bolus of contrast performed, repeat scan done and lesion again not well seen  Needle advanced using landmarks  Needle position felt to be within lesion, however as the lesion was no well seen it is possible biopsy was not lesional     3 cores taken, needled removed and D-stat used  Small hematoma which was not enlarging on 5 minute follow up scan  If tissue is not lesional would recommend continued follow up with imaging as biopsy was difficult due to very poor visualization of lesion even with IV contrast     Complications: None immediate      Anesthesia: conscious sedation
Patient is a 74 y/o female with pMHx of Paroxysmal afib/aflutter s/p DCCV ( refused AC, currently on asa ) , CHF, HFrEF ( LVEF 39% )  with worsening SHETH and cough, now admitted to [facility] for acute on chronic CHF exacerbation.  Acute on chronic CHF exacerbation; URI; Afib/Aflutter s/p IV Cardizem and PO Cardizem - rate controlled now; HTN;

## 2023-11-22 NOTE — DIETITIAN INITIAL EVALUATION ADULT - COLLABORATION WITH OTHER PROVIDERS
Interventions: meals and snacks, coordination of care  Monitoring/Evaluation: diet order, energy intake, weight, labs, skin status, NFPE

## 2023-11-22 NOTE — DIETITIAN INITIAL EVALUATION ADULT - PERTINENT LABORATORY DATA
11-22    137  |  99  |  22<H>  ----------------------------<  104<H>  4.4   |  29  |  0.8    Ca    8.5      22 Nov 2023 05:38  Mg     2.1     11-22    TPro  6.2  /  Alb  3.9  /  TBili  0.7  /  DBili  0.2  /  AST  56<H>  /  ALT  44<H>  /  AlkPhos  98  11-21  A1C with Estimated Average Glucose Result: 6.7 % (11-21-23 @ 05:26)

## 2023-11-22 NOTE — PROGRESS NOTE ADULT - ASSESSMENT
Assessment	  72 y/o female with pMHx of Paroxysmal afib/aflutter s/p DCCV ( refused AC, currently on asa ) , CHF, HFrEF ( LVEF 39% )  with worsening SHETH and cough, now admitted to [facility] tele for acute on chronic CHF exacerbation.     # acute on chronic CHF exacerbation   -admit to [facility] tele   -trop <.01, <.01   -continue telemetry monitoring  -vitals as per floor protocol  -EKG revealing afib, RVR   -CXR no focal consolidation  -elevated BNP   -lasix 40mg x1 in the ed   -monitor electrolyte ,maintain K >4 , mg >2 and supple as needed  -daily weight  -strict I&O  -TTE 11/20- EF 20-25%, mild to mod mv regurg , mild pulm htn,    #URI  - actively coughing  - RVP/ FLU Neg    #Afib/Aflutter s/p IV Cardizem and PO Cardizem - Rate controlled now   - CHADSCVASC 4  -Benefit and risk of AC, discussed with patient and agrees now to AC   - will start pt on Eliquis 5mg q12 for A/C  - Continue  metoprolol to 50 BID    #HTN   -Continue with Metroprolol 50 mg q12 for now     FULL CODE   Cardiac diet   DVT/GI PPX: Eliquis and Protonix    Disp : DC planning    [phone]     Assessment	  72 y/o female with pMHx of Paroxysmal afib/aflutter s/p DCCV ( refused AC, currently on asa ) , CHF, HFrEF ( LVEF 39% )  with worsening SHETH and cough, now admitted to [facility] tele for acute on chronic CHF exacerbation.     # acute on chronic CHF exacerbation   -admit to cards tele   -trop negative x2,  CXR no focal consolidation, elevated BNP   -TTE 11/20- EF 20-25%, mild to mod mv regurg , mild pulm htn,  -lasix 40mg x1 in the ed ( 11/20)   -monitor electrolyte ,maintain K >4 , mg >2 and supple as needed  -daily weight, strict I&O  - LHC pending if patient is amenable     #Afib/Aflutter s/p IV Cardizem and PO Cardizem - Rate controlled now   - CHADSCVASC 4  -Benefit and risk of AC, discussed with patient and agrees now to AC   - cont on Eliquis 5mg q12 for A/C  - Continue  metoprolol to 50 BID  - pt Declining DCCV .    #URI  #cough   - obtain CT chest no contrast   - RVP/ FLU Neg    #HTN   -Continue with Metroprolol 50 mg q12 for now     FULL CODE   Cardiac diet   DVT/GI PPX: Eliquis and Protonix    Disp : DC planning    [phone]

## 2023-11-22 NOTE — DIETITIAN INITIAL EVALUATION ADULT - EDUCATION DIETARY MODIFICATIONS
discussed importance of heart healthy diet - monitoring sodium and fat intake, incorporating more fruits, vegetables and whole grains. Emphasized that this does not mean stopping the intake of cultural foods, but adjusting portions and having a balanced diet. Also discussed the importance of weight monitoring related to fluid retention 2/2 CHF/(1) partially meets; needs review/practice/verbalization

## 2023-11-23 LAB
ALBUMIN SERPL ELPH-MCNC: 3.4 G/DL — LOW (ref 3.5–5.2)
ALBUMIN SERPL ELPH-MCNC: 3.4 G/DL — LOW (ref 3.5–5.2)
ALP SERPL-CCNC: 91 U/L — SIGNIFICANT CHANGE UP (ref 30–115)
ALP SERPL-CCNC: 91 U/L — SIGNIFICANT CHANGE UP (ref 30–115)
ALT FLD-CCNC: 41 U/L — SIGNIFICANT CHANGE UP (ref 0–41)
ALT FLD-CCNC: 41 U/L — SIGNIFICANT CHANGE UP (ref 0–41)
ANION GAP SERPL CALC-SCNC: 9 MMOL/L — SIGNIFICANT CHANGE UP (ref 7–14)
ANION GAP SERPL CALC-SCNC: 9 MMOL/L — SIGNIFICANT CHANGE UP (ref 7–14)
AST SERPL-CCNC: 51 U/L — HIGH (ref 0–41)
AST SERPL-CCNC: 51 U/L — HIGH (ref 0–41)
BILIRUB SERPL-MCNC: 0.6 MG/DL — SIGNIFICANT CHANGE UP (ref 0.2–1.2)
BILIRUB SERPL-MCNC: 0.6 MG/DL — SIGNIFICANT CHANGE UP (ref 0.2–1.2)
BUN SERPL-MCNC: 21 MG/DL — HIGH (ref 10–20)
BUN SERPL-MCNC: 21 MG/DL — HIGH (ref 10–20)
CALCIUM SERPL-MCNC: 8.8 MG/DL — SIGNIFICANT CHANGE UP (ref 8.4–10.5)
CALCIUM SERPL-MCNC: 8.8 MG/DL — SIGNIFICANT CHANGE UP (ref 8.4–10.5)
CHLORIDE SERPL-SCNC: 98 MMOL/L — SIGNIFICANT CHANGE UP (ref 98–110)
CHLORIDE SERPL-SCNC: 98 MMOL/L — SIGNIFICANT CHANGE UP (ref 98–110)
CO2 SERPL-SCNC: 28 MMOL/L — SIGNIFICANT CHANGE UP (ref 17–32)
CO2 SERPL-SCNC: 28 MMOL/L — SIGNIFICANT CHANGE UP (ref 17–32)
CREAT SERPL-MCNC: 0.9 MG/DL — SIGNIFICANT CHANGE UP (ref 0.7–1.5)
CREAT SERPL-MCNC: 0.9 MG/DL — SIGNIFICANT CHANGE UP (ref 0.7–1.5)
DIGOXIN SERPL-MCNC: 0.9 NG/ML — SIGNIFICANT CHANGE UP (ref 0.8–2)
DIGOXIN SERPL-MCNC: 0.9 NG/ML — SIGNIFICANT CHANGE UP (ref 0.8–2)
EGFR: 68 ML/MIN/1.73M2 — SIGNIFICANT CHANGE UP
EGFR: 68 ML/MIN/1.73M2 — SIGNIFICANT CHANGE UP
GLUCOSE SERPL-MCNC: 110 MG/DL — HIGH (ref 70–99)
GLUCOSE SERPL-MCNC: 110 MG/DL — HIGH (ref 70–99)
HCT VFR BLD CALC: 44 % — SIGNIFICANT CHANGE UP (ref 37–47)
HCT VFR BLD CALC: 44 % — SIGNIFICANT CHANGE UP (ref 37–47)
HGB BLD-MCNC: 14 G/DL — SIGNIFICANT CHANGE UP (ref 12–16)
HGB BLD-MCNC: 14 G/DL — SIGNIFICANT CHANGE UP (ref 12–16)
MAGNESIUM SERPL-MCNC: 1.9 MG/DL — SIGNIFICANT CHANGE UP (ref 1.8–2.4)
MAGNESIUM SERPL-MCNC: 1.9 MG/DL — SIGNIFICANT CHANGE UP (ref 1.8–2.4)
MCHC RBC-ENTMCNC: 29.7 PG — SIGNIFICANT CHANGE UP (ref 27–31)
MCHC RBC-ENTMCNC: 29.7 PG — SIGNIFICANT CHANGE UP (ref 27–31)
MCHC RBC-ENTMCNC: 31.8 G/DL — LOW (ref 32–37)
MCHC RBC-ENTMCNC: 31.8 G/DL — LOW (ref 32–37)
MCV RBC AUTO: 93.2 FL — SIGNIFICANT CHANGE UP (ref 81–99)
MCV RBC AUTO: 93.2 FL — SIGNIFICANT CHANGE UP (ref 81–99)
NRBC # BLD: 0 /100 WBCS — SIGNIFICANT CHANGE UP (ref 0–0)
NRBC # BLD: 0 /100 WBCS — SIGNIFICANT CHANGE UP (ref 0–0)
PLATELET # BLD AUTO: 195 K/UL — SIGNIFICANT CHANGE UP (ref 130–400)
PLATELET # BLD AUTO: 195 K/UL — SIGNIFICANT CHANGE UP (ref 130–400)
PMV BLD: 11.1 FL — HIGH (ref 7.4–10.4)
PMV BLD: 11.1 FL — HIGH (ref 7.4–10.4)
POTASSIUM SERPL-MCNC: 4.5 MMOL/L — SIGNIFICANT CHANGE UP (ref 3.5–5)
POTASSIUM SERPL-MCNC: 4.5 MMOL/L — SIGNIFICANT CHANGE UP (ref 3.5–5)
POTASSIUM SERPL-SCNC: 4.5 MMOL/L — SIGNIFICANT CHANGE UP (ref 3.5–5)
POTASSIUM SERPL-SCNC: 4.5 MMOL/L — SIGNIFICANT CHANGE UP (ref 3.5–5)
PROT SERPL-MCNC: 6.1 G/DL — SIGNIFICANT CHANGE UP (ref 6–8)
PROT SERPL-MCNC: 6.1 G/DL — SIGNIFICANT CHANGE UP (ref 6–8)
RBC # BLD: 4.72 M/UL — SIGNIFICANT CHANGE UP (ref 4.2–5.4)
RBC # BLD: 4.72 M/UL — SIGNIFICANT CHANGE UP (ref 4.2–5.4)
RBC # FLD: 13.7 % — SIGNIFICANT CHANGE UP (ref 11.5–14.5)
RBC # FLD: 13.7 % — SIGNIFICANT CHANGE UP (ref 11.5–14.5)
SODIUM SERPL-SCNC: 135 MMOL/L — SIGNIFICANT CHANGE UP (ref 135–146)
SODIUM SERPL-SCNC: 135 MMOL/L — SIGNIFICANT CHANGE UP (ref 135–146)
WBC # BLD: 6.31 K/UL — SIGNIFICANT CHANGE UP (ref 4.8–10.8)
WBC # BLD: 6.31 K/UL — SIGNIFICANT CHANGE UP (ref 4.8–10.8)
WBC # FLD AUTO: 6.31 K/UL — SIGNIFICANT CHANGE UP (ref 4.8–10.8)
WBC # FLD AUTO: 6.31 K/UL — SIGNIFICANT CHANGE UP (ref 4.8–10.8)

## 2023-11-23 PROCEDURE — 99233 SBSQ HOSP IP/OBS HIGH 50: CPT

## 2023-11-23 PROCEDURE — 93010 ELECTROCARDIOGRAM REPORT: CPT

## 2023-11-23 RX ORDER — DIGOXIN 250 MCG
250 TABLET ORAL EVERY 8 HOURS
Refills: 0 | Status: COMPLETED | OUTPATIENT
Start: 2023-11-23 | End: 2023-11-23

## 2023-11-23 RX ORDER — METOPROLOL TARTRATE 50 MG
50 TABLET ORAL
Refills: 0 | Status: DISCONTINUED | OUTPATIENT
Start: 2023-11-23 | End: 2023-11-24

## 2023-11-23 RX ORDER — METOPROLOL TARTRATE 50 MG
50 TABLET ORAL ONCE
Refills: 0 | Status: COMPLETED | OUTPATIENT
Start: 2023-11-23 | End: 2023-11-23

## 2023-11-23 RX ORDER — BENZOCAINE 10 %
1 GEL (GRAM) MUCOUS MEMBRANE THREE TIMES A DAY
Refills: 0 | Status: DISCONTINUED | OUTPATIENT
Start: 2023-11-23 | End: 2023-11-24

## 2023-11-23 RX ORDER — DIGOXIN 250 MCG
125 TABLET ORAL DAILY
Refills: 0 | Status: DISCONTINUED | OUTPATIENT
Start: 2023-11-24 | End: 2023-11-24

## 2023-11-23 RX ORDER — MAGNESIUM SULFATE 500 MG/ML
2 VIAL (ML) INJECTION ONCE
Refills: 0 | Status: COMPLETED | OUTPATIENT
Start: 2023-11-23 | End: 2023-11-23

## 2023-11-23 RX ADMIN — Medication 1 SPRAY(S): at 22:22

## 2023-11-23 RX ADMIN — Medication 250 MICROGRAM(S): at 23:33

## 2023-11-23 RX ADMIN — APIXABAN 5 MILLIGRAM(S): 2.5 TABLET, FILM COATED ORAL at 17:11

## 2023-11-23 RX ADMIN — Medication 25 GRAM(S): at 09:03

## 2023-11-23 RX ADMIN — Medication 50 MILLIGRAM(S): at 05:45

## 2023-11-23 RX ADMIN — Medication 50 MILLIGRAM(S): at 17:11

## 2023-11-23 RX ADMIN — Medication 1 PUFF(S): at 19:43

## 2023-11-23 RX ADMIN — APIXABAN 5 MILLIGRAM(S): 2.5 TABLET, FILM COATED ORAL at 05:45

## 2023-11-23 RX ADMIN — Medication 250 MICROGRAM(S): at 12:44

## 2023-11-23 NOTE — PROGRESS NOTE ADULT - ASSESSMENT
Assessment	  72 y/o female with pMHx of Paroxysmal afib/aflutter s/p DCCV ( refused AC, currently on asa ) , CHF, HFrEF ( LVEF 39% )  with worsening SHETH and cough, now admitted to [facility] tele for acute on chronic CHF exacerbation.       IMPRESSION and PLAN  #Afib/Aflutter   In s/p IV Cardizem and PO Cardizem - Rate controlled now   - CHADSCVASC 4  -Benefit and risk of AC, discussed with patient and agrees now to AC   - cont on Eliquis 5mg q12 for A/C  - Continue  metoprolol to 50 BID  - start on digoxin ( 500mcg IV loading dose today ) and start on daily dose of 125mcg po in Am   - pt Declining DCCV .    # acute on chronic CHF exacerbation w  -admit to [facility] tele   -trop negative x2,  CXR no focal consolidation, elevated BNP   -TTE 11/20- EF 20-25%, mild to mod mv regurg , mild pulm htn,  -lasix 40mg x1 in the ed ( 11/20). pt euvolemic  -monitor electrolyte ,maintain K >4 , mg >2 and supple as needed  -daily weight, strict I&O  - LHC pending if patient is amenable       #URI  #cough ( improving)   - ct CHEST was obtained shows reactive lung dx; atrovert prn q 6 hrs ordered   - cont on tessalon bob  - RVP/ FLU Neg    #HTN   -Continue with Metroprolol 50 mg q12 for now     FULL CODE   Cardiac diet   DVT/GI PPX: Eliquis and Protonix    Disp : DC planning    [phone]     Assessment	  74 y/o female with pMHx of Paroxysmal afib/aflutter s/p DCCV ( refused AC, currently on asa ) , CHF, HFrEF ( LVEF 39% )  with worsening SHETH and cough, now admitted to [facility] tele for acute on chronic CHF exacerbation.       IMPRESSION and PLAN  #Afib/Aflutter   In s/p IV Cardizem and PO Cardizem - Rate controlled now   - CHADSCVASC 4  -Benefit and risk of AC, discussed with patient and agrees now to AC   - cont on Eliquis 5mg q12 for A/C  - Continue  metoprolol to 50 BID  - start on digoxin ( 500mcg IV loading dose today ) and start on daily dose of 125mcg po in Am   - pt Declining DCCV .    # acute on chronic CHF exacerbation w  -admit to [facility] tele   -trop negative x2,  CXR no focal consolidation, elevated BNP   -TTE 11/20- EF 20-25%, mild to mod mv regurg , mild pulm htn,  -lasix 40mg x1 in the ed ( 11/20). pt euvolemic  -monitor electrolyte ,maintain K >4 , mg >2 and supple as needed  -daily weight, strict I&O  - LHC pending if patient is amenable       #URI  #cough ( improving)   - ct CHEST was obtained shows reactive lung dx; atrovert prn q 6 hrs ordered   - cont on tessalon bob  - RVP/ FLU Neg    #HTN   -Continue with Metroprolol 50 mg q12 for now     FULL CODE   Cardiac diet   DVT/GI PPX: Eliquis and Protonix    Disp : DC planning    [phone]

## 2023-11-23 NOTE — PROGRESS NOTE ADULT - TIME BILLING
Chart review, bedside evaluation, review of CT imaging, discussion of care with the patient
Chart review, bedside evaluation, coordination of care with patient's primary cardiologist, discussion of care with the patient.
Bedside evaluation and exam  Patient education  Negotiation with patient for certain medications  Medication adjustments  Monitoring of HR on telemetry

## 2023-11-23 NOTE — PROGRESS NOTE ADULT - NS ATTEND AMEND GEN_ALL_CORE FT
72yo [place]an-speaking F patient of Dr. Zhao with paroxsymal Afib/flutter (prior DCCV, not compliant with AC, currently on ASA), HFrEF (previously 39%, now with LVEF 20-25%), and HTN who was admitted for acute-on-chronic systolic heart failure in the setting of Afib with RVR.     Plan:   - Not amenable to YOLANDA/DCCV  - Continue apixaban 5 mg BID (patient now taking the medication because her heart is weaker, does not understand the constant need for AC given her Afib history)  - Continue Lopressor 50 mg BID (this is the patient's home dose and she is not amenable to an increase)  - Cannot use diltiazem due to hypotension and HFrEF  - Cannot use amiodarone as patient has not been anticoagulated  - Will start digoxin for HR control  - Ipratropium PRN for reactive airway disease  - Tessalon Perles for cough
In brief, 73F, hx paroxysmal AF/AFL not on AC (prior DCCV), HFrEF thought 2/2 TIC, and hypertension admitted for acute on chronic systolic heart failure complicated by rapid atrial fibrillation in the setting of probable upper respiratory infection.    Labs:  - Hgb 14.1, Plt 211  - Cr 0.9, K 4.5, BUN 25  - Trop <0.01, pBNP 5034    Imaging:  - CXR grossly unremarkable  - TTE EF 20-25, mod LA dilatation, mild mod MR, mild pHTN    Impression:  (1) Probable URI, RVP negative  (2) Acute on chronic systolic heart failure  (3) Rapid atrial fibrillation  (4) Hypertension    Plan:  - Discussed YOLANDA/DCCV in detail. Patient is not amenable at this time.  - Discussed LHC for ischemic evaluation in light of worsened EF. She is interested but wishes to await recovery of her URI before proceeding.  - Rate control with BB +/- digoxin. Avoid CCB in light of severe LV dysfunction. Avoid amiodarone to avoid pharmacologic cardioversion.  - AC for thromboembolic prophylaxis  - If no improvement of cough consider CT chest.
In brief, 73F, hx paroxysmal AF/AFL not on AC (prior DCCV), HFrEF thought 2/2 TIC, and hypertension admitted for acute on chronic systolic heart failure complicated by rapid atrial fibrillation in the setting of probable upper respiratory infection.    Labs:  - Hgb 14.1, Plt 211  - Cr 0.9, K 4.5, BUN 25  - Trop <0.01, pBNP 5034    Imaging:  - CXR grossly unremarkable  - TTE EF 20-25, mod LA dilatation, mild mod MR, mild pHTN    Impression:  (1) Probable URI, RVP negative  (2) Acute on chronic systolic heart failure  (3) Rapid atrial fibrillation  (4) Hypertension    Plan:  - Discussed YOLANDA/DCCV in detail. Patient is not amenable at this time (discussed again today)  - Discussed LHC for ischemic evaluation in light of worsened EF. She is interested but wishes to await recovery of her URI before proceeding.  - Rate control with BB +/- digoxin. Avoid CCB in light of severe LV dysfunction. Avoid amiodarone to avoid pharmacologic cardioversion.  - AC for thromboembolic prophylaxis  - CT chest today showed findings consistent with reactive airway disease. Can use atrovent nebulizers as needed.    Anticipate discharge tomorrow versus Friday.

## 2023-11-23 NOTE — PROGRESS NOTE ADULT - SUBJECTIVE AND OBJECTIVE BOX
Chief complaint: Patient is a 73y old  Female who presents with a chief complaint of cough and shortness of breath.    Interval history:   Patient seen and examined at bedside. Denies any cp, palpitations.   patient still not rate controlledon telemetry. afib     Review of systems: A complete 10-point review of systems was obtained and is negative except as stated in the interval history.    Vitals:  T(C): 36.9 (23 Nov 2023 07:21), Max: 37 (22 Nov 2023 16:03)  T(F): 98.4 (23 Nov 2023 07:21), Max: 98.6 (22 Nov 2023 16:03)  HR: 98 (23 Nov 2023 08:00) (80 - 115)  BP: 95/67 (23 Nov 2023 08:00) (81/64 - 139/62)  BP(mean): 75 (23 Nov 2023 08:00) (69 - 89)    RR: 19 (23 Nov 2023 07:21) (18 - 20)  SpO2: 93% (23 Nov 2023 04:45) (93% - 98%)    O2 Parameters below as of 23 Nov 2023 07:21  Patient On (Oxygen Delivery Method): room air          Ins & outs:     I&O's Summary    21 Nov 2023 07:01  -  22 Nov 2023 07:00  --------------------------------------------------------  IN: 440 mL / OUT: 200 mL / NET: 240 mL    22 Nov 2023 07:01  -  22 Nov 2023 10:01  --------------------------------------------------------  IN: 200 mL / OUT: 0 mL / NET: 200 mL        Weight trend:  Weight (kg): 73.5 (11-20)    Physical exam:  General: No apparent distress  HEENT: Anicteric sclera. Moist mucous membranes. JVD-   Cardiac: Regular rate and rhythm. No murmurs, rubs, or gallops.   Vascular: Symmetric radial pulses. Dorsalis pedis pulses palpable.   Respiratory: Normal effort.  Clear to ascultation.   Abdomen: Soft, nontender. Audible bowel sounds.   Extremities: Warm with no edema. No cyanosis or clubbing.   Skin: Warm and dry. No rash.   Neurologic: Grossly normal motor function.   Psychiatric: Oriented to person, place, and time.     Data reviewed:  - Telemetry: Afib/Flutter 109, NSVT 2 episodes   - ECG < from: 12 Lead ECG (11.20.23 @ 07:17) >    Diagnosis Line Atrial fibrillation with rapid ventricular response with premature ventricular  or aberrantly conducted complexes  Nonspecific T wave abnormality  Abnormal ECG    < end of copied text >    - TTE < from: TTE Echo Complete w/ Contrast w/ Doppler (11.20.23 @ 10:46) >      Summary:   1. Severely decreased global left ventricular systolic function. Left   ventricular ejection fraction, by visual estimation, is 20 to 25%. The   left ventricular diastolic function could not be assessed in this study  due to atrial arrhythmia.   2. Normal sized right ventricle with mildly reduced systolic function.   3. Moderate to severe left atrial enlargement.   4. Moderately enlarged right atrium.   5. Mild to moderate mitral valve regurgitation.   6. Sclerotic aortic valve with normal opening.   7. Mild pulmonary hypertension (PASP = 46mmHg).   8. There is no evidence of pericardial effusion.   9. Compared to prior study, left ventricular systolic function has   worsened.    < end of copied text >    - Chest x-ray < from: Xray Chest 1 View- PORTABLE-Urgent (Xray Chest 1 View- PORTABLE-Urgent .) (11.20.23 @ 00:04) >  FINDINGS/  IMPRESSION:      No focal consolidation, pneumothorax or pleural effusion.    Stable cardiomediastinal silhouette.    Unchanged osseous structures.    < end of copied text >     No recent Stress test, CCTA, Cardiac catheterization or Cardiac MRI.      - Labs:                        14.0   6.31  )-----------( 195      ( 23 Nov 2023 05:36 )             44.0     11-23    135  |  98  |  21<H>  ----------------------------<  110<H>  4.5   |  28  |  0.9    Ca    8.8      23 Nov 2023 05:36  Mg     1.9     11-23    TPro  6.1  /  Alb  3.4<L>  /  TBili  0.6  /  DBili  x   /  AST  51<H>  /  ALT  41  /  AlkPhos  91  11-23    LIVER FUNCTIONS - ( 23 Nov 2023 05:36 )  Alb: 3.4 g/dL / Pro: 6.1 g/dL / ALK PHOS: 91 U/L / ALT: 41 U/L / AST: 51 U/L / GGT: x               Lactate Trend    Urinalysis Basic - ( 23 Nov 2023 05:36 )    Color: x / Appearance: x / SG: x / pH: x  Gluc: 110 mg/dL / Ketone: x  / Bili: x / Urobili: x   Blood: x / Protein: x / Nitrite: x   Leuk Esterase: x / RBC: x / WBC x   Sq Epi: x / Non Sq Epi: x / Bacteria: x                            Medications:  apixaban 5 milliGRAM(s) Oral every 12 hours  metoprolol tartrate 50 milliGRAM(s) Oral every 6 hours  potassium chloride    Tablet ER 20 milliEquivalent(s) Oral every 2 hours    Drips:    PRN:     Allergies    No Known Allergies    Intolerances

## 2023-11-24 ENCOUNTER — TRANSCRIPTION ENCOUNTER (OUTPATIENT)
Age: 73
End: 2023-11-24

## 2023-11-24 VITALS
SYSTOLIC BLOOD PRESSURE: 143 MMHG | RESPIRATION RATE: 19 BRPM | HEART RATE: 84 BPM | TEMPERATURE: 98 F | DIASTOLIC BLOOD PRESSURE: 64 MMHG

## 2023-11-24 LAB
ANION GAP SERPL CALC-SCNC: 9 MMOL/L — SIGNIFICANT CHANGE UP (ref 7–14)
ANION GAP SERPL CALC-SCNC: 9 MMOL/L — SIGNIFICANT CHANGE UP (ref 7–14)
BUN SERPL-MCNC: 17 MG/DL — SIGNIFICANT CHANGE UP (ref 10–20)
BUN SERPL-MCNC: 17 MG/DL — SIGNIFICANT CHANGE UP (ref 10–20)
CALCIUM SERPL-MCNC: 8.6 MG/DL — SIGNIFICANT CHANGE UP (ref 8.4–10.5)
CALCIUM SERPL-MCNC: 8.6 MG/DL — SIGNIFICANT CHANGE UP (ref 8.4–10.5)
CHLORIDE SERPL-SCNC: 98 MMOL/L — SIGNIFICANT CHANGE UP (ref 98–110)
CHLORIDE SERPL-SCNC: 98 MMOL/L — SIGNIFICANT CHANGE UP (ref 98–110)
CO2 SERPL-SCNC: 29 MMOL/L — SIGNIFICANT CHANGE UP (ref 17–32)
CO2 SERPL-SCNC: 29 MMOL/L — SIGNIFICANT CHANGE UP (ref 17–32)
CREAT SERPL-MCNC: 0.8 MG/DL — SIGNIFICANT CHANGE UP (ref 0.7–1.5)
CREAT SERPL-MCNC: 0.8 MG/DL — SIGNIFICANT CHANGE UP (ref 0.7–1.5)
DIGOXIN SERPL-MCNC: 1.2 NG/ML — SIGNIFICANT CHANGE UP (ref 0.8–2)
DIGOXIN SERPL-MCNC: 1.2 NG/ML — SIGNIFICANT CHANGE UP (ref 0.8–2)
EGFR: 78 ML/MIN/1.73M2 — SIGNIFICANT CHANGE UP
EGFR: 78 ML/MIN/1.73M2 — SIGNIFICANT CHANGE UP
GLUCOSE SERPL-MCNC: 88 MG/DL — SIGNIFICANT CHANGE UP (ref 70–99)
GLUCOSE SERPL-MCNC: 88 MG/DL — SIGNIFICANT CHANGE UP (ref 70–99)
HCT VFR BLD CALC: 43.9 % — SIGNIFICANT CHANGE UP (ref 37–47)
HCT VFR BLD CALC: 43.9 % — SIGNIFICANT CHANGE UP (ref 37–47)
HGB BLD-MCNC: 14 G/DL — SIGNIFICANT CHANGE UP (ref 12–16)
HGB BLD-MCNC: 14 G/DL — SIGNIFICANT CHANGE UP (ref 12–16)
MAGNESIUM SERPL-MCNC: 2 MG/DL — SIGNIFICANT CHANGE UP (ref 1.8–2.4)
MAGNESIUM SERPL-MCNC: 2 MG/DL — SIGNIFICANT CHANGE UP (ref 1.8–2.4)
MCHC RBC-ENTMCNC: 29.2 PG — SIGNIFICANT CHANGE UP (ref 27–31)
MCHC RBC-ENTMCNC: 29.2 PG — SIGNIFICANT CHANGE UP (ref 27–31)
MCHC RBC-ENTMCNC: 31.9 G/DL — LOW (ref 32–37)
MCHC RBC-ENTMCNC: 31.9 G/DL — LOW (ref 32–37)
MCV RBC AUTO: 91.6 FL — SIGNIFICANT CHANGE UP (ref 81–99)
MCV RBC AUTO: 91.6 FL — SIGNIFICANT CHANGE UP (ref 81–99)
NRBC # BLD: 0 /100 WBCS — SIGNIFICANT CHANGE UP (ref 0–0)
NRBC # BLD: 0 /100 WBCS — SIGNIFICANT CHANGE UP (ref 0–0)
PLATELET # BLD AUTO: 185 K/UL — SIGNIFICANT CHANGE UP (ref 130–400)
PLATELET # BLD AUTO: 185 K/UL — SIGNIFICANT CHANGE UP (ref 130–400)
PMV BLD: 10.8 FL — HIGH (ref 7.4–10.4)
PMV BLD: 10.8 FL — HIGH (ref 7.4–10.4)
POTASSIUM SERPL-MCNC: 4.3 MMOL/L — SIGNIFICANT CHANGE UP (ref 3.5–5)
POTASSIUM SERPL-MCNC: 4.3 MMOL/L — SIGNIFICANT CHANGE UP (ref 3.5–5)
POTASSIUM SERPL-SCNC: 4.3 MMOL/L — SIGNIFICANT CHANGE UP (ref 3.5–5)
POTASSIUM SERPL-SCNC: 4.3 MMOL/L — SIGNIFICANT CHANGE UP (ref 3.5–5)
RBC # BLD: 4.79 M/UL — SIGNIFICANT CHANGE UP (ref 4.2–5.4)
RBC # BLD: 4.79 M/UL — SIGNIFICANT CHANGE UP (ref 4.2–5.4)
RBC # FLD: 13.2 % — SIGNIFICANT CHANGE UP (ref 11.5–14.5)
RBC # FLD: 13.2 % — SIGNIFICANT CHANGE UP (ref 11.5–14.5)
SODIUM SERPL-SCNC: 136 MMOL/L — SIGNIFICANT CHANGE UP (ref 135–146)
SODIUM SERPL-SCNC: 136 MMOL/L — SIGNIFICANT CHANGE UP (ref 135–146)
WBC # BLD: 6.19 K/UL — SIGNIFICANT CHANGE UP (ref 4.8–10.8)
WBC # BLD: 6.19 K/UL — SIGNIFICANT CHANGE UP (ref 4.8–10.8)
WBC # FLD AUTO: 6.19 K/UL — SIGNIFICANT CHANGE UP (ref 4.8–10.8)
WBC # FLD AUTO: 6.19 K/UL — SIGNIFICANT CHANGE UP (ref 4.8–10.8)

## 2023-11-24 PROCEDURE — 99239 HOSP IP/OBS DSCHRG MGMT >30: CPT

## 2023-11-24 PROCEDURE — 93010 ELECTROCARDIOGRAM REPORT: CPT

## 2023-11-24 RX ORDER — ALBUTEROL 90 UG/1
1 AEROSOL, METERED ORAL EVERY 4 HOURS
Refills: 0 | Status: DISCONTINUED | OUTPATIENT
Start: 2023-11-24 | End: 2023-11-24

## 2023-11-24 RX ORDER — DIGOXIN 250 MCG
1 TABLET ORAL
Qty: 30 | Refills: 0
Start: 2023-11-24 | End: 2023-12-23

## 2023-11-24 RX ORDER — APIXABAN 2.5 MG/1
1 TABLET, FILM COATED ORAL
Qty: 60 | Refills: 0
Start: 2023-11-24 | End: 2023-12-23

## 2023-11-24 RX ORDER — ALBUTEROL 90 UG/1
1 AEROSOL, METERED ORAL
Qty: 1 | Refills: 0
Start: 2023-11-24 | End: 2023-12-23

## 2023-11-24 RX ORDER — ASPIRIN/CALCIUM CARB/MAGNESIUM 324 MG
1 TABLET ORAL
Refills: 0 | DISCHARGE

## 2023-11-24 RX ORDER — DABIGATRAN ETEXILATE MESYLATE 150 MG/1
1 CAPSULE ORAL
Qty: 60 | Refills: 0
Start: 2023-11-24 | End: 2023-12-23

## 2023-11-24 RX ORDER — IPRATROPIUM BROMIDE 0.2 MG/ML
1 SOLUTION, NON-ORAL INHALATION
Qty: 1 | Refills: 0
Start: 2023-11-24 | End: 2023-12-23

## 2023-11-24 RX ORDER — ALBUTEROL 90 UG/1
2.5 AEROSOL, METERED ORAL EVERY 6 HOURS
Refills: 0 | Status: DISCONTINUED | OUTPATIENT
Start: 2023-11-24 | End: 2023-11-24

## 2023-11-24 RX ORDER — NITROGLYCERIN 6.5 MG
1 CAPSULE, EXTENDED RELEASE ORAL
Qty: 10 | Refills: 0
Start: 2023-11-24

## 2023-11-24 RX ORDER — RIVAROXABAN 15 MG-20MG
1 KIT ORAL
Qty: 30 | Refills: 0
Start: 2023-11-24 | End: 2023-12-23

## 2023-11-24 RX ORDER — ALBUTEROL 90 UG/1
1 AEROSOL, METERED ORAL
Qty: 0 | Refills: 0 | DISCHARGE
Start: 2023-11-24

## 2023-11-24 RX ADMIN — Medication 125 MICROGRAM(S): at 05:58

## 2023-11-24 RX ADMIN — Medication 50 MILLIGRAM(S): at 05:58

## 2023-11-24 RX ADMIN — APIXABAN 5 MILLIGRAM(S): 2.5 TABLET, FILM COATED ORAL at 05:57

## 2023-11-24 RX ADMIN — Medication 1 PUFF(S): at 07:52

## 2023-11-24 RX ADMIN — Medication 1 PUFF(S): at 14:17

## 2023-11-24 NOTE — DISCHARGE NOTE PROVIDER - PROVIDER TOKENS
Bedside shift change report given to Chris Greenberg (oncoming nurse) by Shannan Juarez (offgoing nurse). Report included the following information SBAR. PROVIDER:[TOKEN:[[id number]],FOLLOWUP:[1 month]] PROVIDER:[TOKEN:[[id number]],FOLLOWUP:[1 month]],PROVIDER:[TOKEN:[[id number]],FOLLOWUP:[1 month]]

## 2023-11-24 NOTE — DISCHARGE NOTE PROVIDER - CARE PROVIDER_API CALL
Travis Head  Cardiovascular Disease  [address] [place] [address]  Phone: [phone]  Fax: [phone]  Follow Up Time: 1 month   Travis Head  Cardiovascular Disease  [address] [place] [address]  Phone: [phone]  Fax: [phone]  Follow Up Time: 1 month    Bartolome Hope  Cardiac Electrophysiology  [address] [place] [address], [address]  [place] [address]  Phone: [phone]  Fax: [phone]  Follow Up Time: 1 month

## 2023-11-24 NOTE — DISCHARGE NOTE PROVIDER - HOSPITAL COURSE
Patient is a 73 year old Russian speaking female patient of Dr. Head with paroxysmal afib/flutter ( prior DCCV, non compliant with AC, currently on ASA), HFrEF (previously 39%, now with LVEF 20-25% and HTN admitted for acute on chronic systolic heart failure complicated by Afib with RVR in the setting of probable upper respiratory infection. On admission patient troponins negative x2, CXR unremarkable, BNP 5034, treated with Lasix 40ivp x1 in the ed, TTE on 11/20 showed  ef decreased to 20-25%  from 9/22/23 TTE  LVEF 39%. Patient refusing YOLANDA/DCCV as well as LHC for ischemic workup but wants to wait until her respiratory infection clears. RVP/ flu negative as well as covid. CT Chest showed findings consistent with reactive airways disease, patient on albuterol and atrovent. Patient HR treated with BB and digoxin, CCB avoided due to LV dysfunction as well as amiodarone in order to avoid pharmacologic cardioversion. Patient agreed to taking Eliquis for thromboembolic prophalaxis. Patient is a 73 year old Russian speaking female patient of Dr. Head with paroxysmal afib/flutter ( prior DCCV, non compliant with AC, currently on ASA), HFrEF (previously 39%, now with LVEF 20-25% and HTN admitted for acute on chronic systolic heart failure complicated by Afib with RVR in the setting of probable upper respiratory infection. On admission patient troponins negative x2, CXR unremarkable, BNP 5034, treated with Lasix 40ivp x1 in the ed, TTE on 11/20 showed  ef decreased to 20-25%  from 9/22/23 TTE  LVEF 39%. Patient refusing YOLANDA/DCCV as well as LHC for ischemic workup but wants to wait until her respiratory infection clears. RVP/ flu negative as well as covid. CT Chest showed findings consistent with reactive airways disease, patient on albuterol and atrovent. Patient HR treated with BB and digoxin, CCB avoided due to LV dysfunction as well as amiodarone in order to avoid pharmacologic cardioversion. Patient agreed to taking Xarelto for thromboembolic prophalaxis.

## 2023-11-24 NOTE — DISCHARGE NOTE PROVIDER - PREFACE STATEMENT FOR MINUTES SPENT
[Disease: _____________________] : Disease: [unfilled]
[T: ___] : T[unfilled]
[N: ___] : N[unfilled]
[M: ___] : M[unfilled]
[de-identified] : Ms. Handley is a 76 y/o post menopausal female PMHx: osteoporosis ( on alendronate) referred by Dr. Yoon for oncological evaluation of R breast cancer. SHe is s/p \par R breast partial mastectomy on April 14, 2023- Dr. Yoon and reconstruction + left breast reduction mastopexy ( for asymmetry)- Dr. Fields. pt recovered well from surgery without complications. She has an evaluation with our radiation oncologist- Dr. Orozco on June 2, 2023.   \par \par Oncological History\par \par pt endorses she did not feel her breast mass \par \par January 5, 2023 b/l mammogram/sono screening ( [facility]): scattered areas of fibroglandular density. there is question asymmetric density in the posterior inferior right breast . left breat there is questioned nodular density in the lateral aspect. Further imaging recommended. \par \par January 20, 2023 b/l diagnostic mammo/sono b/l ([facility]): far posterior Inferior right breast with spiculated mass appx 0.7cm at 5-6 o\par clock position and 6 o'clock location from nipple subtle hypoechoic shadowing mass 0.7cm . left breast there is minimal nodularity which may represent an island of parenchyma. extremely benign appearance. Recommended US core biopsy. \par \par S/P R Sono Core Bx x 3 Sires (1/25/23): R 6:00 N10: +IDCA/DCIS, SBR II, R 5-6:00 N8: +IDCA, SBR I, R 5:00 N8 IDCA/Pap DCIS, SBR I\par \par April 14, 2023 - R breast partial mastectomy on April 14, 2023- Dr. Yoon and reconstruction + left breast reduction mastopexy ( for aymmetry)- Dr. Fields\par \par May 1, 2023- HER2 fish  NEGATIVE\par \par BREAST CANCER RISK FACTORS \par Age of menses: 14 - 15 y.o \par Age of menopause: 51\par First child born: 1961\par total number of children: G3. son passed away from ALS. \par hysterectomy- b/l tubal ligation history. \par family history of breast cancer: none\par family history of ovarian cancer: none\par hx of OC pills or HRT: OCP x 1 year. no HRT\par Self monthly breast exams: \par Ever done genetic testing: none\par \par Health Maintenance\par PCP- Galilea Bernardo - up to date\par GYN- does not go to gyn\par CNY/EGD- 2019 \par BONE DENSITY- 2021 \par 
[de-identified] : Multifocal\par Ductal invasive - 8 mm and 5 mm\par Lobular invasive 8 mm
[de-identified] : here for follow up \par today is day 10 of 20 for radiation treatment\par feeling overall well\par April 14 2023 mastectomy\par reconstruction done
(4) excellent
I personally spent

## 2023-11-24 NOTE — DISCHARGE NOTE PROVIDER - NSDCMRMEDTOKEN_GEN_ALL_CORE_FT
albuterol 2.5 mg/3 mL (0.083%) inhalation solution: 1 puff(s) inhaled 3 times a day  albuterol 90 mcg/inh inhalation aerosol: 1 puff(s) inhaled every 4 hours  apixaban 5 mg oral tablet: 1 tab(s) orally every 12 hours  benzonatate 100 mg oral capsule: 1 cap(s) orally every 8 hours as needed for Cough  digoxin 125 mcg (0.125 mg) oral tablet: 1 tab(s) orally once a day  ipratropium 17 mcg/inh inhalation aerosol: 1 puff(s) inhaled every 6 hours  Lasix 40 mg oral tablet: 1 tab(s) orally prn  metoprolol tartrate 50 mg oral tablet: 1 tab(s) orally 2 times a day   albuterol 2.5 mg/3 mL (0.083%) inhalation solution: 1 puff(s) inhaled 3 times a day  albuterol 90 mcg/inh inhalation aerosol: 1 puff(s) inhaled every 4 hours  apixaban 5 mg oral tablet: 1 tab(s) orally every 12 hours  benzonatate 100 mg oral capsule: 1 cap(s) orally every 8 hours as needed for Cough  digoxin 125 mcg (0.125 mg) oral tablet: 1 tab(s) orally once a day  ipratropium 17 mcg/inh inhalation aerosol: 1 puff(s) inhaled every 6 hours  Lasix 40 mg oral tablet: 1 tab(s) orally prn  metoprolol tartrate 50 mg oral tablet: 1 tab(s) orally 2 times a day  nitroglycerin 0.3 mg sublingual tablet: 1 tab(s) sublingually every 5 minutes as needed for  chest pain Please take 1 tablet every 5 minutes up to 3 doses as needed   albuterol 2.5 mg/3 mL (0.083%) inhalation solution: 1 puff(s) inhaled 3 times a day  albuterol 90 mcg/inh inhalation aerosol: 1 puff(s) inhaled every 4 hours  apixaban 5 mg oral tablet: 1 tab(s) orally every 12 hours  benzonatate 100 mg oral capsule: 1 cap(s) orally every 8 hours as needed for Cough  digoxin 125 mcg (0.125 mg) oral tablet: 1 tab(s) orally once a day  ipratropium 17 mcg/inh inhalation aerosol: 1 puff(s) inhaled every 6 hours  Lasix 40 mg oral tablet: 1 tab(s) orally prn  metoprolol tartrate 50 mg oral tablet: 1 tab(s) orally 2 times a day  nitroglycerin 0.3 mg sublingual tablet: 1 tab(s) sublingually every 5 minutes as needed for  chest pain Please take 1 tablet every 5 minutes up to 3 doses as needed  Pradaxa 150 mg oral capsule: 1 cap(s) orally 2 times a day  Xarelto 20 mg oral tablet: 1 tab(s) orally once a day (at bedtime)   albuterol 2.5 mg/3 mL (0.083%) inhalation solution: 1 puff(s) inhaled 3 times a day  albuterol 90 mcg/inh inhalation aerosol: 1 puff(s) inhaled every 4 hours  benzonatate 100 mg oral capsule: 1 cap(s) orally every 8 hours as needed for Cough  digoxin 125 mcg (0.125 mg) oral tablet: 1 tab(s) orally once a day  ipratropium 17 mcg/inh inhalation aerosol: 1 puff(s) inhaled every 6 hours  Lasix 40 mg oral tablet: 1 tab(s) orally prn  metoprolol tartrate 50 mg oral tablet: 1 tab(s) orally 2 times a day  nitroglycerin 0.3 mg sublingual tablet: 1 tab(s) sublingually every 5 minutes as needed for  chest pain Please take 1 tablet every 5 minutes up to 3 doses as needed  Xarelto 20 mg oral tablet: 1 tab(s) orally once a day (at bedtime)

## 2023-11-24 NOTE — DISCHARGE NOTE NURSING/CASE MANAGEMENT/SOCIAL WORK - PATIENT PORTAL LINK FT
You can access the FollowMyHealth Patient Portal offered by [facility] by registering at the following website: [email or web address]. By joining [employer]’s FollowMyHealth portal, you will also be able to view your health information using other applications (apps) compatible with our system.

## 2023-11-24 NOTE — DISCHARGE NOTE PROVIDER - CARE PROVIDERS DIRECT ADDRESSES
[email or web address]

## 2023-11-24 NOTE — DISCHARGE NOTE PROVIDER - NSDCCPCAREPLAN_GEN_ALL_CORE_FT
PRINCIPAL DISCHARGE DIAGNOSIS  Diagnosis: Rapid atrial fibrillation  Assessment and Plan of Treatment:       SECONDARY DISCHARGE DIAGNOSES  Diagnosis: Dyspnea on exertion  Assessment and Plan of Treatment:

## 2023-11-24 NOTE — DISCHARGE NOTE PROVIDER - ATTENDING DISCHARGE PHYSICAL EXAMINATION:
Irregularly irregular rhythm.     Patient's HR is rate controlled on Lopressor 50 mg BID and digoxin 125 mcg daily.

## 2023-12-05 DIAGNOSIS — J06.9 ACUTE UPPER RESPIRATORY INFECTION, UNSPECIFIED: ICD-10-CM

## 2023-12-05 DIAGNOSIS — I11.0 HYPERTENSIVE HEART DISEASE WITH HEART FAILURE: ICD-10-CM

## 2023-12-05 DIAGNOSIS — I50.23 ACUTE ON CHRONIC SYSTOLIC (CONGESTIVE) HEART FAILURE: ICD-10-CM

## 2023-12-05 DIAGNOSIS — Z79.82 LONG TERM (CURRENT) USE OF ASPIRIN: ICD-10-CM

## 2023-12-05 DIAGNOSIS — Z91.148 PATIENT'S OTHER NONCOMPLIANCE WITH MEDICATION REGIMEN FOR OTHER REASON: ICD-10-CM

## 2023-12-05 DIAGNOSIS — I48.0 PAROXYSMAL ATRIAL FIBRILLATION: ICD-10-CM

## 2023-12-05 DIAGNOSIS — I48.92 UNSPECIFIED ATRIAL FLUTTER: ICD-10-CM

## 2024-01-22 PROBLEM — I48.21 PERMANENT ATRIAL FIBRILLATION: Chronic | Status: ACTIVE | Noted: 2023-11-20

## 2024-01-22 PROBLEM — I50.22 CHRONIC SYSTOLIC (CONGESTIVE) HEART FAILURE: Chronic | Status: ACTIVE | Noted: 2023-11-20

## 2024-01-23 ENCOUNTER — APPOINTMENT (OUTPATIENT)
Dept: CARDIOLOGY | Facility: CLINIC | Age: 74
End: 2024-01-23
Payer: MEDICARE

## 2024-01-23 VITALS
SYSTOLIC BLOOD PRESSURE: 92 MMHG | DIASTOLIC BLOOD PRESSURE: 66 MMHG | HEIGHT: 62 IN | BODY MASS INDEX: 28.52 KG/M2 | HEART RATE: 113 BPM | WEIGHT: 155 LBS

## 2024-01-23 DIAGNOSIS — I48.19 OTHER PERSISTENT ATRIAL FIBRILLATION: ICD-10-CM

## 2024-01-23 DIAGNOSIS — I42.8 OTHER CARDIOMYOPATHIES: ICD-10-CM

## 2024-01-23 PROCEDURE — 93000 ELECTROCARDIOGRAM COMPLETE: CPT

## 2024-01-23 PROCEDURE — 99214 OFFICE O/P EST MOD 30 MIN: CPT | Mod: 25

## 2024-01-23 RX ORDER — ASPIRIN 81 MG
81 TABLET, DELAYED RELEASE (ENTERIC COATED) ORAL DAILY
Refills: 0 | Status: DISCONTINUED | COMMUNITY
End: 2024-01-23

## 2024-01-23 NOTE — REVIEW OF SYSTEMS
[Dyspnea on exertion] : dyspnea during exertion [Chest Discomfort] : chest discomfort [Palpitations] : palpitations [Negative] : Neurological [Blurry Vision] : no blurred vision [Lower Ext Edema] : no extremity edema [Leg Claudication] : no intermittent leg claudication [Easy Bruising] : no tendency for easy bruising [Anxiety] : no anxiety

## 2024-01-23 NOTE — PHYSICAL EXAM
[Well Developed] : well developed [Well Nourished] : well nourished [No Acute Distress] : no acute distress [Normal Conjunctiva] : normal conjunctiva [Normal Venous Pressure] : normal venous pressure [No Carotid Bruit] : no carotid bruit [No Murmur] : no murmur [No Rub] : no rub [No Gallop] : no gallop [Clear Lung Fields] : clear lung fields [Good Air Entry] : good air entry [No Respiratory Distress] : no respiratory distress  [Soft] : abdomen soft [Non Tender] : non-tender [Normal Bowel Sounds] : normal bowel sounds [Normal Gait] : normal gait [No Edema] : no edema [No Cyanosis] : no cyanosis [No Clubbing] : no clubbing [No Varicosities] : no varicosities [Normal PT B/L] : normal PT B/L [No Rash] : no rash [Moves all extremities] : moves all extremities [No Focal Deficits] : no focal deficits [Normal Speech] : normal speech [Alert and Oriented] : alert and oriented [Normal memory] : normal memory [de-identified] : irregular S1, S2

## 2024-01-23 NOTE — CARDIOLOGY SUMMARY
[de-identified] : 01/23/24: A-fib, VR 113bpm, NSST changes.  [de-identified] : 9/22: LVEF 39%, mod enlarged RV, mod MR,TR, mod elevated PA pressures.

## 2024-01-23 NOTE — HISTORY OF PRESENT ILLNESS
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  She has stopped Eliquis and started ASA after someone she knew had a GIB on Eliquis.  Patient is s/p DCCV 3-4 years. Remains in Afib, Patient refuses AC, takes Aspirin  Pt presents for a follow up visit. Still has episodes of left-sided pressure chest discomfort not related to exertion, and SHETH when she climbs stairs or bends down.  She was admitted in November for acute on chronic systolic heart failure complicated by Afib with RVR in the setting of probable upper respiratory infection. TTE on 11/20 showed EF decreased to 20-25% from 9/22/23 TTE LVEF 39%. Patient refused YOLANDA/DCCV as well as LHC for ischemic workup but wanted to wait until her respiratory infection clears.  ECHO in hospital: LVEF 20 to 25%.  Moderate to severe left atrial enlargement. Moderately enlarged right atrium. Mild to moderate mitral valve regurgitation. Sclerotic aortic valve with normal opening. Mild pulmonary hypertension (PASP = 46mmHg).

## 2024-01-23 NOTE — ASSESSMENT
[FreeTextEntry1] : 71 y/o F A-fib, VR is still elevated. 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 Chronic systolic CHF. CHADS Vasc score 4. Episodes of chest pain c/w angina. SHETH.  Plan: Stat Amiodarone 200mg BID  Continue Furosemide and K-dur PRN. Continue Metoprolol and Digoxin. F/u in 2 weeks. Will need MPI once VR is controlled.  Travis Head MD

## 2024-01-25 RX ORDER — RIVAROXABAN 20 MG/1
20 TABLET, FILM COATED ORAL
Qty: 30 | Refills: 4 | Status: ACTIVE | COMMUNITY
Start: 1900-01-01 | End: 1900-01-01

## 2024-02-06 ENCOUNTER — APPOINTMENT (OUTPATIENT)
Dept: CARDIOLOGY | Facility: CLINIC | Age: 74
End: 2024-02-06

## 2024-02-06 ENCOUNTER — APPOINTMENT (OUTPATIENT)
Dept: CARDIOLOGY | Facility: CLINIC | Age: 74
End: 2024-02-06
Payer: MEDICARE

## 2024-02-06 VITALS
HEART RATE: 64 BPM | HEIGHT: 62 IN | DIASTOLIC BLOOD PRESSURE: 70 MMHG | BODY MASS INDEX: 28.71 KG/M2 | WEIGHT: 156 LBS | SYSTOLIC BLOOD PRESSURE: 136 MMHG

## 2024-02-06 DIAGNOSIS — R06.02 SHORTNESS OF BREATH: ICD-10-CM

## 2024-02-06 DIAGNOSIS — I48.91 UNSPECIFIED ATRIAL FIBRILLATION: ICD-10-CM

## 2024-02-06 DIAGNOSIS — I50.22 CHRONIC SYSTOLIC (CONGESTIVE) HEART FAILURE: ICD-10-CM

## 2024-02-06 DIAGNOSIS — R07.89 OTHER CHEST PAIN: ICD-10-CM

## 2024-02-06 PROCEDURE — 99214 OFFICE O/P EST MOD 30 MIN: CPT | Mod: 25

## 2024-02-06 PROCEDURE — 93000 ELECTROCARDIOGRAM COMPLETE: CPT

## 2024-02-06 RX ORDER — DULOXETINE HYDROCHLORIDE 20 MG/1
20 CAPSULE, DELAYED RELEASE PELLETS ORAL
Refills: 0 | Status: ACTIVE | COMMUNITY

## 2024-02-08 NOTE — PROGRESS NOTE ADULT - SUBJECTIVE AND OBJECTIVE BOX
Patient is a 68y old  Female who presents with a chief complaint of palpitations (06 Jan 2019 17:36)    HPI:  69 y/o FM w/ pMHx of Paroxysmal afib/aflutter p/w 3 day hx of palpitations and was a/w intermittent exertional sob. Since the palpitations were not resolving on their own pt presented to the ED. Pt was previously diagnosed w/ P-Afib/flutter by Dr. Hope and was started on metoprolol and flecanide for rhythm control. Pt's CHADSCVASC is 2 and was advised to be on eliquis. Whilst on eliquis an "aide" told her that she can easily bleed while being on the blood thinner and advised her to stop it hence she agreed. Pt has not bee on blood thinners for the past year.  Currently denies palpitaations, sob, dizziness, confusion, chest pain or abdominal pain or any focal deficitis    SUBJ:  Patient seen and examined. She remains in A. flutter with variable block. Did not go home due to high HR. Now with HR .      MEDICATIONS  (STANDING):  apixaban 5 milliGRAM(s) Oral every 12 hours  aspirin  chewable 81 milliGRAM(s) Oral daily  diltiazem    milliGRAM(s) Oral daily  influenza   Vaccine 0.5 milliLiter(s) IntraMuscular once  metoprolol tartrate 50 milliGRAM(s) Oral two times a day    MEDICATIONS  (PRN):            Vital Signs Last 24 Hrs  T(C): 36.7 (07 Jan 2019 05:47), Max: 36.7 (07 Jan 2019 05:47)  T(F): 98 (07 Jan 2019 05:47), Max: 98 (07 Jan 2019 05:47)  HR: 75 (07 Jan 2019 05:47) (75 - 133)  BP: 109/62 (07 Jan 2019 05:47) (104/62 - 126/60)  BP(mean): --  RR: 18 (07 Jan 2019 05:47) (16 - 18)  SpO2: 98% (07 Jan 2019 08:10) (98% - 98%)      PHYSICAL EXAM:    GEN: AAO x 3, NAD  HEENT: NC/AT, PERRL  Neck: No JVD, no bruits  CV: irreg, S1-S2, no murmur  Lungs: CTAB  Abd: Soft, non-tender  Ext: No edema      I&O's Summary  	    TELEMETRY: flutter        LABS:                        12.2   9.30  )-----------( 281      ( 05 Jan 2019 18:15 )             37.6     01-07    139  |  99  |  14  ----------------------------<  137<H>  3.8   |  27  |  0.7    Ca    9.2      07 Jan 2019 08:25    TPro  6.4  /  Alb  3.7  /  TBili  0.4  /  DBili  x   /  AST  32  /  ALT  102<H>  /  AlkPhos  94  01-07              BNP  RADIOLOGY & ADDITIONAL STUDIES:      IMPRESSION AND PLAN: Pt with about four days of a cough and wheezing, found to be RSV+  - Desat to 89% on RA, continue with O2 supplementation with nasal cannula for now and wean as tolerated  - CT Chest: Bilateral mild bronchiectasis as on June 29, 2019 with superimposed mild impacted distal and dilated airways may be due to internal secretions   In ED: ceftriaxone 1000mg x1, azithromycin 500mg x1, solumedrol 125mg IVP  - Pt likely with reactive airway 2/2 acute viral illness, possible concomitant bacterial infection   - continue with azithromycin and ceftriaxone  - continue duonebs   - will hold off on steroids as pt is mildly hypoxic with no SOB at rest   - fall, aspiration precautions  - trop 139, proBNP 454, likely demand ischemia in setting of acute illness  - Blood cultures sent x2, f/u results   - F/u strep urine Ag and legionella   - F/u ABG Pt with about four days of a cough and wheezing, found to be RSV+  - Desat to 89% on RA, continue with O2 supplementation with nasal cannula for now and wean as tolerated  - CT Chest: Bilateral mild bronchiectasis as on June 29, 2019 with superimposed mild impacted distal and dilated airways may be due to internal secretions   In ED: ceftriaxone 1000mg x1, azithromycin 500mg x1, solumedrol 125mg IVP  - Pt likely with reactive airway 2/2 acute viral illness, possible concomitant bacterial infection   - continue with azithromycin and ceftriaxone  - continue duonebs   - will hold off on steroids as pt is mildly hypoxic with no SOB at rest   - fall, aspiration precautions  - trop 139, proBNP 454, likely demand ischemia in setting of acute illness  - Blood cultures sent x2, f/u results   - F/u strep urine Ag and legionella   - F/u ABG  - F/u procalcitonin  - F/u repeat troponin   - Pulmonology consulted (Dr. Reece), f/u recs Pt with about four days of a cough and wheezing, found to be RSV+  sepsis met on admission, tachycardia  and tachypna  - Desat to 89% on RA, continue with O2 supplementation with nasal cannula for now and wean as tolerated  - CT Chest: Bilateral mild bronchiectasis as on June 29, 2019 with superimposed mild impacted distal and dilated airways may be due to internal secretions   In ED: ceftriaxone 1000mg x1, azithromycin 500mg x1, solumedrol 125mg IVP  - Pt likely with reactive airway 2/2 acute viral illness, possible concomitant bacterial infection   - continue with azithromycin and ceftriaxone  - continue duonebs   - will hold off on steroids as pt is mildly hypoxic with no SOB at rest   - fall, aspiration precautions  - trop 139, proBNP 454, likely demand ischemia in setting of acute illness  - Blood cultures sent x2, f/u results   - F/u strep urine Ag and legionella   - F/u ABG  - F/u procalcitonin  - F/u repeat troponin   - Pulmonology consulted (Dr. Reece), f/u recs

## 2024-02-09 PROBLEM — I50.22 CHRONIC SYSTOLIC (CONGESTIVE) HEART FAILURE: Status: ACTIVE | Noted: 2020-09-04

## 2024-02-09 PROBLEM — I48.91 ATRIAL FIBRILLATION, UNSPECIFIED TYPE: Status: ACTIVE | Noted: 2020-08-21

## 2024-02-09 PROBLEM — R07.89 CHEST DISCOMFORT: Status: ACTIVE | Noted: 2023-05-01

## 2024-02-09 PROBLEM — R06.02 SHORTNESS OF BREATH ON EXERTION: Status: ACTIVE | Noted: 2024-01-23

## 2024-02-09 NOTE — CARDIOLOGY SUMMARY
[de-identified] : 2/6/24: A-fib, VR 64bpm, NSST changes.  [de-identified] : 9/22: LVEF 39%, mod enlarged RV, mod MR,TR, mod elevated PA pressures.

## 2024-02-09 NOTE — ASSESSMENT
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 Chronic systolic CHF. CHADS Vasc score 4. Episodes of chest pain SHETH  Plan: Nuclear stress test Amiodarone 200mg daily Continue Xarelto, importance discussed again. Continue Furosemide and K-dur PRN. Continue Metoprolol and Digoxin. Pt will be moving to [place], instructed to set up appointment with cardioloigst there ASAP.  Patricia Hollingsworth, DNP, FNP-BC

## 2024-02-09 NOTE — PHYSICAL EXAM
[Well Developed] : well developed [Well Nourished] : well nourished [No Acute Distress] : no acute distress [Normal Conjunctiva] : normal conjunctiva [Normal Venous Pressure] : normal venous pressure [No Carotid Bruit] : no carotid bruit [No Murmur] : no murmur [No Rub] : no rub [No Gallop] : no gallop [Clear Lung Fields] : clear lung fields [Good Air Entry] : good air entry [No Respiratory Distress] : no respiratory distress  [Soft] : abdomen soft [Non Tender] : non-tender [Normal Bowel Sounds] : normal bowel sounds [Normal Gait] : normal gait [No Edema] : no edema [No Cyanosis] : no cyanosis [No Clubbing] : no clubbing [No Varicosities] : no varicosities [Normal PT B/L] : normal PT B/L [No Rash] : no rash [Moves all extremities] : moves all extremities [No Focal Deficits] : no focal deficits [Normal Speech] : normal speech [Alert and Oriented] : alert and oriented [Normal memory] : normal memory [de-identified] : irregular S1, S2

## 2024-02-09 NOTE — HISTORY OF PRESENT ILLNESS
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  She has stopped Eliquis and started ASA after someone she knew had a GIB on Eliquis.  Patient is s/p DCCV 3-4 years. Remains in Afib, Patient refuses AC, takes Aspirin  She was admitted in November for acute on chronic systolic heart failure complicated by Afib with RVR in the setting of probable upper respiratory infection. TTE on 11/20 showed EF decreased to 20-25% from 9/22/23 TTE LVEF 39%. Patient refused YOLANDA/DCCV as well as LHC for ischemic workup but wanted to wait until her respiratory infection clears.  Pt presents for a follow up visit. Still has episodes of left-sided pressure chest discomfort not related to exertion, and SHETH when she climbs stairs or bends down.   ECHO 11/2023: LVEF 20 to 25%.  Moderate to severe left atrial enlargement. Moderately enlarged right atrium. Mild to moderate mitral valve regurgitation. Sclerotic aortic valve with normal opening. Mild pulmonary hypertension (PASP = 46mmHg).

## 2024-02-28 RX ORDER — FUROSEMIDE 40 MG/1
40 TABLET ORAL
Qty: 90 | Refills: 1 | Status: ACTIVE | COMMUNITY
Start: 2021-03-15 | End: 1900-01-01

## 2024-02-28 RX ORDER — DIGOXIN 125 UG/1
125 TABLET ORAL DAILY
Qty: 90 | Refills: 1 | Status: ACTIVE | COMMUNITY
Start: 1900-01-01 | End: 1900-01-01

## 2024-03-06 ENCOUNTER — RX RENEWAL (OUTPATIENT)
Age: 74
End: 2024-03-06

## 2024-03-06 RX ORDER — METOPROLOL TARTRATE 50 MG/1
50 TABLET, FILM COATED ORAL TWICE DAILY
Qty: 270 | Refills: 1 | Status: ACTIVE | COMMUNITY
Start: 2020-08-03 | End: 1900-01-01

## 2024-04-18 ENCOUNTER — APPOINTMENT (OUTPATIENT)
Dept: CARDIOLOGY | Facility: CLINIC | Age: 74
End: 2024-04-18

## 2024-04-22 NOTE — DIETITIAN INITIAL EVALUATION ADULT - ORAL INTAKE PTA/DIET HISTORY
Pt mediated per Mar for Headache. Urine collected and sent to lab    Patient reports fair appetite and PO intake PTA. No chewing/swallowing difficulties usually, just reporting pain with coughing. UBW: 150 lbs. NKFA, no food intolerances.

## 2024-05-13 ENCOUNTER — RX RENEWAL (OUTPATIENT)
Age: 74
End: 2024-05-13

## 2024-05-13 RX ORDER — AMIODARONE HYDROCHLORIDE 200 MG/1
200 TABLET ORAL DAILY
Qty: 30 | Refills: 1 | Status: ACTIVE | COMMUNITY
Start: 2024-01-23 | End: 1900-01-01

## 2025-03-19 NOTE — ED ADULT NURSE NOTE - NS_SISCREENINGSR_GEN_ALL_ED
Subjective   History of Present Illness  Patient is a 55-year-old female with significant past medical history positive for type 2 diabetes, hypertension, cirrhosis presenting to the ER complaints of abdominal pain and distention.  Patient reports history of cirrhosis but has never had a paracentesis.  Patient reports that he has been able to control his fluid levels with Lasix.  Patient reports for the past week he has had an increase in swelling and pain with abdominal distention.  Patient reports that abdominal distention has caused him difficulty breathing or shortness of breath.  Patient denies fever, cough, nausea, vomiting or any additional symptoms today.    History provided by:  Patient   used: No        Review of Systems   Constitutional: Negative.  Negative for fever.   HENT: Negative.     Respiratory: Negative.     Cardiovascular: Negative.  Negative for chest pain.   Gastrointestinal: Negative.  Positive for abdominal pain.   Endocrine: Negative.    Genitourinary: Negative.  Negative for dysuria.   Skin: Negative.    Neurological: Negative.    Psychiatric/Behavioral: Negative.     All other systems reviewed and are negative.      Past Medical History:   Diagnosis Date    Diabetes mellitus     Hypertension     Paraplegia        No Known Allergies    Past Surgical History:   Procedure Laterality Date    NECK SURGERY         No family history on file.    Social History     Socioeconomic History    Marital status:    Tobacco Use    Smoking status: Every Day     Current packs/day: 1.00     Types: Cigarettes    Smokeless tobacco: Never   Substance and Sexual Activity    Alcohol use: No    Drug use: No    Sexual activity: Defer           Objective   Physical Exam  Vitals and nursing note reviewed.   Constitutional:       General: He is not in acute distress.     Appearance: He is well-developed. He is obese. He is ill-appearing. He is not diaphoretic.   HENT:      Head: Normocephalic  and atraumatic.      Right Ear: External ear normal.      Left Ear: External ear normal.      Nose: Nose normal.   Eyes:      Conjunctiva/sclera: Conjunctivae normal.      Pupils: Pupils are equal, round, and reactive to light.   Neck:      Vascular: No JVD.      Trachea: No tracheal deviation.   Cardiovascular:      Rate and Rhythm: Normal rate and regular rhythm.      Heart sounds: Normal heart sounds. No murmur heard.  Pulmonary:      Effort: Pulmonary effort is normal. No respiratory distress.      Breath sounds: Normal breath sounds. No wheezing.   Abdominal:      General: Bowel sounds are normal. There is distension.      Palpations: Abdomen is soft.      Tenderness: There is generalized abdominal tenderness.   Musculoskeletal:         General: No deformity. Normal range of motion.      Cervical back: Normal range of motion and neck supple.   Skin:     General: Skin is warm and dry.      Capillary Refill: Capillary refill takes 2 to 3 seconds.      Coloration: Skin is jaundiced. Skin is not pale.      Findings: No erythema or rash.   Neurological:      Mental Status: He is alert and oriented to person, place, and time.      Cranial Nerves: No cranial nerve deficit.   Psychiatric:         Behavior: Behavior normal.         Thought Content: Thought content normal.         Procedures       Results for orders placed or performed during the hospital encounter of 03/19/25   Comprehensive Metabolic Panel    Collection Time: 03/19/25 11:00 AM    Specimen: Blood   Result Value Ref Range    Glucose 451 (C) 65 - 99 mg/dL    BUN 6 6 - 20 mg/dL    Creatinine 0.68 (L) 0.76 - 1.27 mg/dL    Sodium 123 (L) 136 - 145 mmol/L    Potassium 3.6 3.5 - 5.2 mmol/L    Chloride 89 (L) 98 - 107 mmol/L    CO2 28.3 22.0 - 29.0 mmol/L    Calcium 8.2 (L) 8.6 - 10.5 mg/dL    Total Protein 8.7 (H) 6.0 - 8.5 g/dL    Albumin 3.0 (L) 3.5 - 5.2 g/dL    ALT (SGPT) 19 1 - 41 U/L    AST (SGOT) 27 1 - 40 U/L    Alkaline Phosphatase 131 (H) 39 - 117  U/L    Total Bilirubin 1.4 (H) 0.0 - 1.2 mg/dL    Globulin 5.7 gm/dL    A/G Ratio 0.5 g/dL    BUN/Creatinine Ratio 8.8 7.0 - 25.0    Anion Gap 5.7 5.0 - 15.0 mmol/L    eGFR 110.5 >60.0 mL/min/1.73   Lipase    Collection Time: 03/19/25 11:00 AM    Specimen: Blood   Result Value Ref Range    Lipase 8 (L) 13 - 60 U/L   C-reactive Protein    Collection Time: 03/19/25 11:00 AM    Specimen: Blood   Result Value Ref Range    C-Reactive Protein 6.38 (H) 0.00 - 0.50 mg/dL   Lactic Acid, Plasma    Collection Time: 03/19/25 11:00 AM    Specimen: Blood   Result Value Ref Range    Lactate 1.7 0.5 - 2.0 mmol/L   CBC Auto Differential    Collection Time: 03/19/25 11:00 AM    Specimen: Blood   Result Value Ref Range    WBC 9.01 3.40 - 10.80 10*3/mm3    RBC 4.94 4.14 - 5.80 10*6/mm3    Hemoglobin 9.7 (L) 13.0 - 17.7 g/dL    Hematocrit 34.2 (L) 37.5 - 51.0 %    MCV 69.2 (L) 79.0 - 97.0 fL    MCH 19.6 (L) 26.6 - 33.0 pg    MCHC 28.4 (L) 31.5 - 35.7 g/dL    RDW 20.0 (H) 12.3 - 15.4 %    RDW-SD 48.6 37.0 - 54.0 fl    MPV 9.9 6.0 - 12.0 fL    Platelets 150 140 - 450 10*3/mm3    Neutrophil % 85.0 (H) 42.7 - 76.0 %    Lymphocyte % 7.0 (L) 19.6 - 45.3 %    Monocyte % 5.7 5.0 - 12.0 %    Eosinophil % 1.2 0.3 - 6.2 %    Basophil % 0.4 0.0 - 1.5 %    Immature Grans % 0.7 (H) 0.0 - 0.5 %    Neutrophils, Absolute 7.66 (H) 1.70 - 7.00 10*3/mm3    Lymphocytes, Absolute 0.63 (L) 0.70 - 3.10 10*3/mm3    Monocytes, Absolute 0.51 0.10 - 0.90 10*3/mm3    Eosinophils, Absolute 0.11 0.00 - 0.40 10*3/mm3    Basophils, Absolute 0.04 0.00 - 0.20 10*3/mm3    Immature Grans, Absolute 0.06 (H) 0.00 - 0.05 10*3/mm3    nRBC 0.0 0.0 - 0.2 /100 WBC   aPTT    Collection Time: 03/19/25 11:00 AM    Specimen: Blood   Result Value Ref Range    PTT 28.2 24.5 - 35.9 seconds   Protime-INR    Collection Time: 03/19/25 11:00 AM    Specimen: Blood   Result Value Ref Range    Protime 14.6 11.6 - 15.1 Seconds    INR 1.12 (H) 0.90 - 1.10   Bilirubin, Direct    Collection Time:  03/19/25 11:00 AM    Specimen: Blood   Result Value Ref Range    Bilirubin, Direct 0.6 (H) 0.0 - 0.3 mg/dL   Scan Slide    Collection Time: 03/19/25 11:00 AM    Specimen: Blood   Result Value Ref Range    Anisocytosis Mod/2+ None Seen    Hypochromia Mod/2+ None Seen    Microcytes Mod/2+ None Seen    Platelet Morphology Normal Normal   Acetone    Collection Time: 03/19/25 11:00 AM    Specimen: Blood   Result Value Ref Range    Acetone Negative Negative   Hemoglobin A1c    Collection Time: 03/19/25 11:00 AM    Specimen: Blood   Result Value Ref Range    Hemoglobin A1C 13.10 (H) 4.80 - 5.60 %   Green Top (Gel)    Collection Time: 03/19/25 11:00 AM   Result Value Ref Range    Extra Tube Hold for add-ons.    Lavender Top    Collection Time: 03/19/25 11:00 AM   Result Value Ref Range    Extra Tube hold for add-on    Gold Top - SST    Collection Time: 03/19/25 11:00 AM   Result Value Ref Range    Extra Tube Hold for add-ons.    Light Blue Top    Collection Time: 03/19/25 11:00 AM   Result Value Ref Range    Extra Tube Hold for add-ons.    Urinalysis With Microscopic If Indicated (No Culture) - Urine, Clean Catch    Collection Time: 03/19/25 11:02 AM    Specimen: Urine, Clean Catch   Result Value Ref Range    Color, UA Yellow Yellow, Straw    Appearance, UA Clear Clear    pH, UA 6.0 5.0 - 8.0    Specific Gravity, UA 1.021 1.005 - 1.030    Glucose, UA >=1000 mg/dL (3+) (A) Negative    Ketones, UA Negative Negative    Bilirubin, UA Negative Negative    Blood, UA Negative Negative    Protein, UA Negative Negative    Leuk Esterase, UA Negative Negative    Nitrite, UA Negative Negative    Urobilinogen, UA 0.2 E.U./dL 0.2 - 1.0 E.U./dL   Ammonia    Collection Time: 03/19/25 11:03 AM    Specimen: Arm, Left; Blood   Result Value Ref Range    Ammonia 17 16 - 60 umol/L   Blood Gas, Arterial With Co-Ox    Collection Time: 03/19/25 11:53 AM    Specimen: Arterial Blood   Result Value Ref Range    Site Left Brachial     Alirio's Test N/A      pH, Arterial 7.463 (H) 7.350 - 7.450 pH units    pCO2, Arterial 42.5 35.0 - 45.0 mm Hg    pO2, Arterial 62.3 (L) 83.0 - 108.0 mm Hg    HCO3, Arterial 30.4 (H) 20.0 - 26.0 mmol/L    Base Excess, Arterial 6.0 (H) 0.0 - 2.0 mmol/L    O2 Saturation, Arterial 93.6 (L) 94.0 - 99.0 %    Hemoglobin, Blood Gas 9.1 (L) 14 - 18 g/dL    Hematocrit, Blood Gas 28.0 (L) 38.0 - 51.0 %    Oxyhemoglobin 87.1 (L) 94 - 99 %    Methemoglobin 0.10 0.00 - 3.00 %    Carboxyhemoglobin 6.8 (H) 0 - 5 %    A-a DO2 31.7 0.0 - 300.0 mmHg    CO2 Content 31.7 22 - 33 mmol/L    Barometric Pressure for Blood Gas 723 mmHg    Modality Room Air     FIO2 21 %    Ventilator Mode NA     Collected by [id number]     pH, Temp Corrected      pCO2, Temperature Corrected      pO2, Temperature Corrected     POC Glucose Once    Collection Time: 03/19/25  1:15 PM    Specimen: Blood   Result Value Ref Range    Glucose 276 (H) 70 - 130 mg/dL   POC Glucose Once    Collection Time: 03/19/25  3:52 PM    Specimen: Blood   Result Value Ref Range    Glucose 412 (C) 70 - 130 mg/dL      US Paracentesis   Final Result     Successful image-guided paracentesis. Laboratory sample sent for   analysis.       This report was finalized on 3/19/2025 4:06 PM by Dr. Cecil Agudelo MD.          CT Abdomen Pelvis With Contrast   Final Result   1.  Moderate to large ascites.   2.  Cirrhotic nodular liver contour.   3.  Degenerative changes lumbar spine as described.       This report was finalized on 3/19/2025 1:12 PM by Dr. Cecil Agudelo MD.               ED Course  ED Course as of 03/19/25 1643   Wed Mar 19, 2025   1139 Glucose(!!): 451 [SS]   1140 C-Reactive Protein(!): 6.38 [SS]   1140 Lactate: 1.7 [SS]   1203 Hemoglobin A1C(!): 13.10 [SS]   1203 Discussed case with INR and they agreed to do a ultrasound paracentesis. [SS]   1519 CT Abdomen Pelvis With Contrast [SS]      ED Course User Index  [SS] Melissa Alfaro, APRN                                                       Medical  Decision Making  Patient is a 55-year-old female with significant past medical history positive for type 2 diabetes, hypertension, cirrhosis presenting to the ER complaints of abdominal pain and distention.  Patient reports history of cirrhosis but has never had a paracentesis.  Patient reports that he has been able to control his fluid levels with Lasix.  Patient reports for the past week he has had an increase in swelling and pain with abdominal distention.  Patient reports that abdominal distention has caused him difficulty breathing or shortness of breath.  Patient denies fever, cough, nausea, vomiting or any additional symptoms today.    MDM:    Escalation of care including admission/observation considered    - Discussions of management with other providers: Interventional radiology for paracentesis    - Discussed/reviewed with Radiology regarding test interpretation    - Independent interpretation: Labs    - Additional patient history obtained from: None    - Review of external non-ED record (if available):  Prior Inpt record, Office record, Outpt record, Prior Outpt labs, PCP record, Outside ED record, Other    - Chronic conditions affecting care: See HPI and medical Hx.    - Social Determinants of health significantly affecting care:  None and ETOH        Medical Decision Making Discussion:    Cirrhosis with ascites    The patient has been given very strict return precautions to return to the emergency department should there be any acute change or worsening of their condition.  I have explained my findings and the patient has expressed understanding to me.  I explained that the work-up performed in the ED has been based on the specific complaint and concern, as the nature of emergency medicine is complaint driven and they understand that new symptoms may arise.  I have told them that, should there be any new symptoms, worsening or changing symptoms, a new work-up may be indicated that they are encouraged to  return to the emergency department or promptly contact their primary care physician. We have employed a shared decision-making process as the discussion of their disposition.  The patient has been educated as to the nature of the visit, the tests and work-up performed and the findings from today's visit. At this time, there does not appear to be any acute emergent process that necessitates admission to the hospital, however, the patient understands that this can change unexpectedly. At this time, the patient is stable for discharge home and agrees to follow-up with her primary care physician in the next 24 to 48 hours or earlier should they be able to obtain an appointment.    The patient was counseled regarding diagnostic results and treatment plan and patient has indicated understanding of these instructions.    Problems Addressed:  Cirrhosis of liver with ascites, unspecified hepatic cirrhosis type: complicated acute illness or injury  Other ascites: complicated acute illness or injury    Amount and/or Complexity of Data Reviewed  Labs: ordered. Decision-making details documented in ED Course.  Radiology: ordered. Decision-making details documented in ED Course.    Risk  OTC drugs.  Prescription drug management.        Final diagnoses:   Other ascites   Cirrhosis of liver with ascites, unspecified hepatic cirrhosis type       ED Disposition  ED Disposition       ED Disposition   Discharge    Condition   Stable    Comment   --               PATIENT CONNECTION - [facility]  See Provider List  [facility] [place] [address]  [phone]  Schedule an appointment as soon as possible for a visit            Medication List      No changes were made to your prescriptions during this visit.            Melissa Alfaro, APRN  03/19/25 1525       Melissa Alfaro, APRN  03/19/25 [id number]     Negative
